# Patient Record
Sex: FEMALE | Race: WHITE | NOT HISPANIC OR LATINO | Employment: UNEMPLOYED | URBAN - METROPOLITAN AREA
[De-identification: names, ages, dates, MRNs, and addresses within clinical notes are randomized per-mention and may not be internally consistent; named-entity substitution may affect disease eponyms.]

---

## 2018-07-02 ENCOUNTER — OFFICE VISIT CONVERTED (OUTPATIENT)
Dept: FAMILY MEDICINE CLINIC | Facility: CLINIC | Age: 23
End: 2018-07-02
Attending: NURSE PRACTITIONER

## 2018-08-15 ENCOUNTER — OFFICE VISIT CONVERTED (OUTPATIENT)
Dept: FAMILY MEDICINE CLINIC | Facility: CLINIC | Age: 23
End: 2018-08-15
Attending: NURSE PRACTITIONER

## 2019-09-12 ENCOUNTER — OFFICE VISIT CONVERTED (OUTPATIENT)
Dept: FAMILY MEDICINE CLINIC | Facility: CLINIC | Age: 24
End: 2019-09-12
Attending: NURSE PRACTITIONER

## 2019-09-13 ENCOUNTER — HOSPITAL ENCOUNTER (OUTPATIENT)
Dept: FAMILY MEDICINE CLINIC | Facility: CLINIC | Age: 24
Discharge: HOME | End: 2019-09-13
Attending: NURSE PRACTITIONER

## 2019-09-13 ENCOUNTER — OFFICE VISIT CONVERTED (OUTPATIENT)
Dept: FAMILY MEDICINE CLINIC | Facility: CLINIC | Age: 24
End: 2019-09-13
Attending: NURSE PRACTITIONER

## 2019-09-13 LAB
25(OH)D3 SERPL-MCNC: 26.2 NG/ML (ref 30–100)
ALBUMIN SERPL-MCNC: 4 G/DL (ref 3.5–5)
ALBUMIN/GLOB SERPL: 1.1 {RATIO} (ref 1.4–2.6)
ALP SERPL-CCNC: 67 U/L (ref 42–98)
ALT SERPL-CCNC: 23 U/L (ref 10–40)
ANION GAP SERPL CALC-SCNC: 18 MMOL/L (ref 8–19)
AST SERPL-CCNC: 20 U/L (ref 15–50)
BASOPHILS # BLD AUTO: 0.06 10*3/UL (ref 0–0.2)
BASOPHILS NFR BLD AUTO: 0.7 % (ref 0–3)
BILIRUB SERPL-MCNC: 0.17 MG/DL (ref 0.2–1.3)
BUN SERPL-MCNC: 13 MG/DL (ref 5–25)
BUN/CREAT SERPL: 25 {RATIO} (ref 6–20)
CALCIUM SERPL-MCNC: 9.1 MG/DL (ref 8.7–10.4)
CHLORIDE SERPL-SCNC: 104 MMOL/L (ref 99–111)
CHOLEST SERPL-MCNC: 141 MG/DL (ref 107–200)
CHOLEST/HDLC SERPL: 4.1 {RATIO} (ref 3–6)
CONV ABS IMM GRAN: 0.02 10*3/UL (ref 0–0.2)
CONV CO2: 20 MMOL/L (ref 22–32)
CONV IMMATURE GRAN: 0.2 % (ref 0–1.8)
CONV TOTAL PROTEIN: 7.7 G/DL (ref 6.3–8.2)
CREAT UR-MCNC: 0.52 MG/DL (ref 0.5–0.9)
DEPRECATED RDW RBC AUTO: 41.6 FL (ref 36.4–46.3)
EOSINOPHIL # BLD AUTO: 0.19 10*3/UL (ref 0–0.7)
EOSINOPHIL # BLD AUTO: 2.1 % (ref 0–7)
ERYTHROCYTE [DISTWIDTH] IN BLOOD BY AUTOMATED COUNT: 13.3 % (ref 11.7–14.4)
GFR SERPLBLD BASED ON 1.73 SQ M-ARVRAT: >60 ML/MIN/{1.73_M2}
GLOBULIN UR ELPH-MCNC: 3.7 G/DL (ref 2–3.5)
GLUCOSE SERPL-MCNC: 91 MG/DL (ref 65–99)
HCT VFR BLD AUTO: 43.3 % (ref 37–47)
HDLC SERPL-MCNC: 34 MG/DL (ref 40–60)
HGB BLD-MCNC: 14.1 G/DL (ref 12–16)
LDLC SERPL CALC-MCNC: 90 MG/DL (ref 70–100)
LYMPHOCYTES # BLD AUTO: 1.88 10*3/UL (ref 1–5)
LYMPHOCYTES NFR BLD AUTO: 21.1 % (ref 20–45)
MCH RBC QN AUTO: 28.1 PG (ref 27–31)
MCHC RBC AUTO-ENTMCNC: 32.6 G/DL (ref 33–37)
MCV RBC AUTO: 86.3 FL (ref 81–99)
MONOCYTES # BLD AUTO: 0.54 10*3/UL (ref 0.2–1.2)
MONOCYTES NFR BLD AUTO: 6.1 % (ref 3–10)
NEUTROPHILS # BLD AUTO: 6.23 10*3/UL (ref 2–8)
NEUTROPHILS NFR BLD AUTO: 69.8 % (ref 30–85)
NRBC CBCN: 0 % (ref 0–0.7)
OSMOLALITY SERPL CALC.SUM OF ELEC: 286 MOSM/KG (ref 273–304)
PLATELET # BLD AUTO: 322 10*3/UL (ref 130–400)
PMV BLD AUTO: 11.5 FL (ref 9.4–12.3)
POTASSIUM SERPL-SCNC: 4.3 MMOL/L (ref 3.5–5.3)
RBC # BLD AUTO: 5.02 10*6/UL (ref 4.2–5.4)
SODIUM SERPL-SCNC: 138 MMOL/L (ref 135–147)
T4 FREE SERPL-MCNC: 1.3 NG/DL (ref 0.9–1.8)
TRIGL SERPL-MCNC: 86 MG/DL (ref 40–150)
TSH SERPL-ACNC: 2.33 M[IU]/L (ref 0.27–4.2)
VIT B12 SERPL-MCNC: 823 PG/ML (ref 211–911)
VLDLC SERPL-MCNC: 17 MG/DL (ref 5–37)
WBC # BLD AUTO: 8.92 10*3/UL (ref 4.8–10.8)

## 2019-10-21 ENCOUNTER — OFFICE VISIT CONVERTED (OUTPATIENT)
Dept: FAMILY MEDICINE CLINIC | Facility: CLINIC | Age: 24
End: 2019-10-21
Attending: NURSE PRACTITIONER

## 2020-08-24 ENCOUNTER — HOSPITAL ENCOUNTER (OUTPATIENT)
Dept: URGENT CARE | Facility: CLINIC | Age: 25
Discharge: HOME | End: 2020-08-24
Attending: FAMILY MEDICINE

## 2020-12-03 ENCOUNTER — HOSPITAL ENCOUNTER (OUTPATIENT)
Dept: URGENT CARE | Facility: CLINIC | Age: 25
Discharge: HOME | End: 2020-12-03

## 2021-05-15 VITALS
SYSTOLIC BLOOD PRESSURE: 109 MMHG | OXYGEN SATURATION: 98 % | WEIGHT: 293 LBS | DIASTOLIC BLOOD PRESSURE: 90 MMHG | RESPIRATION RATE: 16 BRPM | HEART RATE: 94 BPM | TEMPERATURE: 97.9 F | HEIGHT: 64 IN | BODY MASS INDEX: 50.02 KG/M2

## 2021-05-15 VITALS
SYSTOLIC BLOOD PRESSURE: 131 MMHG | DIASTOLIC BLOOD PRESSURE: 76 MMHG | BODY MASS INDEX: 50.02 KG/M2 | RESPIRATION RATE: 16 BRPM | HEIGHT: 64 IN | OXYGEN SATURATION: 100 % | HEART RATE: 98 BPM | TEMPERATURE: 98.1 F | WEIGHT: 293 LBS

## 2021-05-15 VITALS
RESPIRATION RATE: 18 BRPM | HEART RATE: 113 BPM | BODY MASS INDEX: 50.02 KG/M2 | TEMPERATURE: 97.7 F | HEIGHT: 64 IN | DIASTOLIC BLOOD PRESSURE: 65 MMHG | OXYGEN SATURATION: 98 % | SYSTOLIC BLOOD PRESSURE: 118 MMHG | WEIGHT: 293 LBS

## 2021-05-16 VITALS
RESPIRATION RATE: 14 BRPM | HEIGHT: 64 IN | WEIGHT: 293 LBS | TEMPERATURE: 98.6 F | BODY MASS INDEX: 50.02 KG/M2 | HEART RATE: 99 BPM | DIASTOLIC BLOOD PRESSURE: 74 MMHG | SYSTOLIC BLOOD PRESSURE: 127 MMHG | OXYGEN SATURATION: 99 %

## 2021-05-16 VITALS
BODY MASS INDEX: 50.02 KG/M2 | WEIGHT: 293 LBS | TEMPERATURE: 98.1 F | SYSTOLIC BLOOD PRESSURE: 115 MMHG | HEART RATE: 100 BPM | OXYGEN SATURATION: 100 % | HEIGHT: 64 IN | DIASTOLIC BLOOD PRESSURE: 77 MMHG | RESPIRATION RATE: 18 BRPM

## 2021-07-01 ENCOUNTER — HOSPITAL ENCOUNTER (EMERGENCY)
Facility: HOSPITAL | Age: 26
Discharge: HOME | End: 2021-07-01
Attending: EMERGENCY MEDICINE | Admitting: EMERGENCY MEDICINE

## 2021-07-01 VITALS
HEIGHT: 65 IN | WEIGHT: 284.39 LBS | HEART RATE: 84 BPM | SYSTOLIC BLOOD PRESSURE: 125 MMHG | DIASTOLIC BLOOD PRESSURE: 72 MMHG | TEMPERATURE: 98.9 F | OXYGEN SATURATION: 99 % | BODY MASS INDEX: 47.38 KG/M2 | RESPIRATION RATE: 18 BRPM

## 2021-07-01 DIAGNOSIS — S39.012A BACK STRAIN, INITIAL ENCOUNTER: Primary | ICD-10-CM

## 2021-07-01 PROCEDURE — 25010000002 KETOROLAC TROMETHAMINE PER 15 MG: Performed by: NURSE PRACTITIONER

## 2021-07-01 PROCEDURE — 96372 THER/PROPH/DIAG INJ SC/IM: CPT

## 2021-07-01 PROCEDURE — 99283 EMERGENCY DEPT VISIT LOW MDM: CPT

## 2021-07-01 RX ORDER — CYANOCOBALAMIN (VITAMIN B-12) 500 MCG
TABLET ORAL
COMMUNITY
End: 2021-09-30

## 2021-07-01 RX ORDER — KETOROLAC TROMETHAMINE 10 MG/1
10 TABLET, FILM COATED ORAL EVERY 6 HOURS PRN
Qty: 12 TABLET | Refills: 0 | Status: SHIPPED | OUTPATIENT
Start: 2021-07-01 | End: 2021-09-30

## 2021-07-01 RX ORDER — CYCLOBENZAPRINE HCL 10 MG
10 TABLET ORAL 3 TIMES DAILY PRN
Qty: 10 TABLET | Refills: 0 | Status: SHIPPED | OUTPATIENT
Start: 2021-07-01 | End: 2021-09-30

## 2021-07-01 RX ORDER — MEDROXYPROGESTERONE ACETATE 150 MG/ML
150 INJECTION, SUSPENSION INTRAMUSCULAR
COMMUNITY

## 2021-07-01 RX ORDER — KETOROLAC TROMETHAMINE 30 MG/ML
60 INJECTION, SOLUTION INTRAMUSCULAR; INTRAVENOUS ONCE
Status: COMPLETED | OUTPATIENT
Start: 2021-07-01 | End: 2021-07-01

## 2021-07-01 RX ORDER — CLINDAMYCIN HYDROCHLORIDE 300 MG/1
300 CAPSULE ORAL 4 TIMES DAILY
Qty: 40 CAPSULE | Refills: 0 | Status: SHIPPED | OUTPATIENT
Start: 2021-07-01 | End: 2021-07-01

## 2021-07-01 RX ORDER — FLUCONAZOLE 150 MG/1
150 TABLET ORAL ONCE
Qty: 1 TABLET | Refills: 0 | Status: SHIPPED | OUTPATIENT
Start: 2021-07-01 | End: 2021-07-01

## 2021-07-01 RX ADMIN — KETOROLAC TROMETHAMINE 60 MG: 60 INJECTION, SOLUTION INTRAMUSCULAR at 23:04

## 2021-07-02 NOTE — ED PROVIDER NOTES
"Subjective   Patient reports that last night she was trying to  a tube TV to move it into another room of her house.  She states that she picked it up and twisted which caused her to \"throw her back out\".  She reports that she dropped the TV and has had left low back pain ever since.  She does report that she has taken Tylenol which did not really seem to help much.      History provided by:  Patient   used: No        Review of Systems   Musculoskeletal: Positive for back pain (Left lower).   All other systems reviewed and are negative.      Past Medical History:   Diagnosis Date   • Bronchitis     chronic       Allergies   Allergen Reactions   • Amoxicillin Hives       Past Surgical History:   Procedure Laterality Date   •  SECTION         History reviewed. No pertinent family history.    Social History     Socioeconomic History   • Marital status: Single     Spouse name: Not on file   • Number of children: Not on file   • Years of education: Not on file   • Highest education level: Not on file   Tobacco Use   • Smoking status: Current Every Day Smoker     Packs/day: 0.50     Types: Cigarettes   Substance and Sexual Activity   • Alcohol use: Not Currently     Comment: socially   • Drug use: Never           Objective   Physical Exam  Vitals and nursing note reviewed.   Constitutional:       General: She is not in acute distress.     Appearance: Normal appearance. She is obese. She is not ill-appearing, toxic-appearing or diaphoretic.   HENT:      Head: Normocephalic and atraumatic.   Eyes:      Pupils: Pupils are equal, round, and reactive to light.   Cardiovascular:      Rate and Rhythm: Normal rate.   Pulmonary:      Effort: Pulmonary effort is normal.   Musculoskeletal:         General: Tenderness (Left lower back) and signs of injury (Left lower back) present.      Cervical back: Normal range of motion and neck supple.   Skin:     General: Skin is warm and dry.   Neurological: "      General: No focal deficit present.      Mental Status: She is alert and oriented to person, place, and time.   Psychiatric:         Mood and Affect: Mood normal.         Behavior: Behavior normal.         Thought Content: Thought content normal.         Procedures           ED Course                                           MDM  Number of Diagnoses or Management Options  Back strain, initial encounter: new and requires workup  Patient Progress  Patient progress: stable      Final diagnoses:   Back strain, initial encounter       ED Disposition  ED Disposition     ED Disposition Condition Comment    Discharge Stable           Provider, No Known  Psychiatric [id number]      list provided         Medication List      New Prescriptions    cyclobenzaprine 10 MG tablet  Commonly known as: FLEXERIL  Take 1 tablet by mouth 3 (Three) Times a Day As Needed for Muscle Spasms.     ketorolac 10 MG tablet  Commonly known as: TORADOL  Take 1 tablet by mouth Every 6 (Six) Hours As Needed for Moderate Pain .           Where to Get Your Medications      These medications were sent to [id number] - [place], [place] - [address] AT [facility] - [phone] [facility] [phone]   [address] [place] [place] [address]    Hours: 24-hours Phone: [phone]   · cyclobenzaprine 10 MG tablet  · ketorolac 10 MG tablet          Michaela Lindsay APRN  07/02/21 0152

## 2021-07-02 NOTE — ED TRIAGE NOTES
Pt c/o low back pain, radiates to the left side, no loss of bowel or bladder control. states she was lifting a heavy TV and twisted her back the wrong way, this occurred last night, now hurts to turn her back.

## 2021-09-30 ENCOUNTER — APPOINTMENT (OUTPATIENT)
Dept: CT IMAGING | Facility: HOSPITAL | Age: 26
End: 2021-09-30

## 2021-09-30 ENCOUNTER — HOSPITAL ENCOUNTER (EMERGENCY)
Facility: HOSPITAL | Age: 26
Discharge: HOME | End: 2021-09-30
Attending: EMERGENCY MEDICINE | Admitting: EMERGENCY MEDICINE

## 2021-09-30 VITALS
SYSTOLIC BLOOD PRESSURE: 130 MMHG | TEMPERATURE: 98.4 F | RESPIRATION RATE: 20 BRPM | DIASTOLIC BLOOD PRESSURE: 87 MMHG | BODY MASS INDEX: 47.97 KG/M2 | HEART RATE: 79 BPM | WEIGHT: 287.92 LBS | HEIGHT: 65 IN | OXYGEN SATURATION: 99 %

## 2021-09-30 DIAGNOSIS — R10.32 LEFT LOWER QUADRANT ABDOMINAL PAIN: Primary | ICD-10-CM

## 2021-09-30 DIAGNOSIS — K62.5 RECTAL BLEEDING: ICD-10-CM

## 2021-09-30 LAB
ALBUMIN SERPL-MCNC: 4.9 G/DL (ref 3.5–5.2)
ALBUMIN/GLOB SERPL: 1.4 G/DL
ALP SERPL-CCNC: 66 U/L (ref 39–117)
ALT SERPL W P-5'-P-CCNC: 30 U/L (ref 1–33)
AMORPH URATE CRY URNS QL MICRO: ABNORMAL /HPF
ANION GAP SERPL CALCULATED.3IONS-SCNC: 12.4 MMOL/L (ref 5–15)
AST SERPL-CCNC: 22 U/L (ref 1–32)
BACTERIA UR QL AUTO: ABNORMAL /HPF
BASOPHILS # BLD AUTO: 0.06 10*3/MM3 (ref 0–0.2)
BASOPHILS NFR BLD AUTO: 0.5 % (ref 0–1.5)
BILIRUB SERPL-MCNC: 0.2 MG/DL (ref 0–1.2)
BILIRUB UR QL STRIP: NEGATIVE
BUN SERPL-MCNC: 12 MG/DL (ref 6–20)
BUN/CREAT SERPL: 19.7 (ref 7–25)
CALCIUM SPEC-SCNC: 9.7 MG/DL (ref 8.6–10.5)
CHLORIDE SERPL-SCNC: 104 MMOL/L (ref 98–107)
CLARITY UR: ABNORMAL
CO2 SERPL-SCNC: 22.6 MMOL/L (ref 22–29)
COLOR UR: YELLOW
CREAT SERPL-MCNC: 0.61 MG/DL (ref 0.57–1)
DEPRECATED RDW RBC AUTO: 41.5 FL (ref 37–54)
EOSINOPHIL # BLD AUTO: 0.17 10*3/MM3 (ref 0–0.4)
EOSINOPHIL NFR BLD AUTO: 1.4 % (ref 0.3–6.2)
ERYTHROCYTE [DISTWIDTH] IN BLOOD BY AUTOMATED COUNT: 12.6 % (ref 12.3–15.4)
GFR SERPL CREATININE-BSD FRML MDRD: 120 ML/MIN/1.73
GLOBULIN UR ELPH-MCNC: 3.4 GM/DL
GLUCOSE SERPL-MCNC: 87 MG/DL (ref 65–99)
GLUCOSE UR STRIP-MCNC: NEGATIVE MG/DL
HCG INTACT+B SERPL-ACNC: <0.5 MIU/ML
HCT VFR BLD AUTO: 44.9 % (ref 34–46.6)
HGB BLD-MCNC: 15.2 G/DL (ref 12–15.9)
HGB UR QL STRIP.AUTO: NEGATIVE
HOLD SPECIMEN: NORMAL
HOLD SPECIMEN: NORMAL
HYALINE CASTS UR QL AUTO: ABNORMAL /LPF
IMM GRANULOCYTES # BLD AUTO: 0.04 10*3/MM3 (ref 0–0.05)
IMM GRANULOCYTES NFR BLD AUTO: 0.3 % (ref 0–0.5)
KETONES UR QL STRIP: NEGATIVE
LEUKOCYTE ESTERASE UR QL STRIP.AUTO: ABNORMAL
LIPASE SERPL-CCNC: 20 U/L (ref 13–60)
LYMPHOCYTES # BLD AUTO: 3.04 10*3/MM3 (ref 0.7–3.1)
LYMPHOCYTES NFR BLD AUTO: 24.1 % (ref 19.6–45.3)
MCH RBC QN AUTO: 30.2 PG (ref 26.6–33)
MCHC RBC AUTO-ENTMCNC: 33.9 G/DL (ref 31.5–35.7)
MCV RBC AUTO: 89.3 FL (ref 79–97)
MONOCYTES # BLD AUTO: 0.6 10*3/MM3 (ref 0.1–0.9)
MONOCYTES NFR BLD AUTO: 4.8 % (ref 5–12)
NEUTROPHILS NFR BLD AUTO: 68.9 % (ref 42.7–76)
NEUTROPHILS NFR BLD AUTO: 8.68 10*3/MM3 (ref 1.7–7)
NITRITE UR QL STRIP: NEGATIVE
NRBC BLD AUTO-RTO: 0 /100 WBC (ref 0–0.2)
PH UR STRIP.AUTO: 7 [PH] (ref 5–8)
PLATELET # BLD AUTO: 321 10*3/MM3 (ref 140–450)
PMV BLD AUTO: 10 FL (ref 6–12)
POTASSIUM SERPL-SCNC: 3.9 MMOL/L (ref 3.5–5.2)
PROT SERPL-MCNC: 8.3 G/DL (ref 6–8.5)
PROT UR QL STRIP: NEGATIVE
RBC # BLD AUTO: 5.03 10*6/MM3 (ref 3.77–5.28)
RBC # UR: ABNORMAL /HPF
REF LAB TEST METHOD: ABNORMAL
SODIUM SERPL-SCNC: 139 MMOL/L (ref 136–145)
SP GR UR STRIP: 1.01 (ref 1–1.03)
SQUAMOUS #/AREA URNS HPF: ABNORMAL /HPF
UROBILINOGEN UR QL STRIP: ABNORMAL
WBC # BLD AUTO: 12.59 10*3/MM3 (ref 3.4–10.8)
WBC UR QL AUTO: ABNORMAL /HPF
WHOLE BLOOD HOLD SPECIMEN: NORMAL
WHOLE BLOOD HOLD SPECIMEN: NORMAL

## 2021-09-30 PROCEDURE — 74177 CT ABD & PELVIS W/CONTRAST: CPT

## 2021-09-30 PROCEDURE — 84702 CHORIONIC GONADOTROPIN TEST: CPT | Performed by: EMERGENCY MEDICINE

## 2021-09-30 PROCEDURE — 99283 EMERGENCY DEPT VISIT LOW MDM: CPT

## 2021-09-30 PROCEDURE — 0 IOPAMIDOL PER 1 ML: Performed by: EMERGENCY MEDICINE

## 2021-09-30 PROCEDURE — 83690 ASSAY OF LIPASE: CPT | Performed by: EMERGENCY MEDICINE

## 2021-09-30 PROCEDURE — 80053 COMPREHEN METABOLIC PANEL: CPT | Performed by: EMERGENCY MEDICINE

## 2021-09-30 PROCEDURE — 85025 COMPLETE CBC W/AUTO DIFF WBC: CPT | Performed by: EMERGENCY MEDICINE

## 2021-09-30 PROCEDURE — 81001 URINALYSIS AUTO W/SCOPE: CPT | Performed by: EMERGENCY MEDICINE

## 2021-09-30 RX ORDER — SODIUM CHLORIDE 0.9 % (FLUSH) 0.9 %
10 SYRINGE (ML) INJECTION AS NEEDED
Status: DISCONTINUED | OUTPATIENT
Start: 2021-09-30 | End: 2021-10-01 | Stop reason: HOSPADM

## 2021-09-30 RX ORDER — DICYCLOMINE HCL 20 MG
20 TABLET ORAL EVERY 6 HOURS
Qty: 28 TABLET | Refills: 0 | Status: SHIPPED | OUTPATIENT
Start: 2021-09-30 | End: 2021-10-07

## 2021-09-30 RX ADMIN — SODIUM CHLORIDE 1000 ML: 9 INJECTION, SOLUTION INTRAVENOUS at 18:45

## 2021-09-30 RX ADMIN — IOPAMIDOL 100 ML: 755 INJECTION, SOLUTION INTRAVENOUS at 20:05

## 2021-10-01 NOTE — DISCHARGE INSTRUCTIONS
Please follow-up with your doctor tomorrow for serial reexamination of the abdomen.    Please discuss the need for gastroenterology referral for colonoscopy    Please return to the emergency room for increased bleeding, increased pain, fever, intractable vomiting, near passing out, passing out, extreme fatigue, weakness or shortness of breath

## 2022-12-07 ENCOUNTER — OFFICE VISIT (OUTPATIENT)
Dept: NEUROLOGY | Facility: CLINIC | Age: 27
End: 2022-12-07

## 2022-12-07 VITALS
HEART RATE: 115 BPM | DIASTOLIC BLOOD PRESSURE: 75 MMHG | HEIGHT: 65 IN | WEIGHT: 293 LBS | BODY MASS INDEX: 48.82 KG/M2 | SYSTOLIC BLOOD PRESSURE: 120 MMHG

## 2022-12-07 DIAGNOSIS — Z87.898 HISTORY OF SEIZURES: ICD-10-CM

## 2022-12-07 DIAGNOSIS — R40.4 STARING EPISODES: ICD-10-CM

## 2022-12-07 DIAGNOSIS — G43.009 MIGRAINE WITHOUT AURA AND WITHOUT STATUS MIGRAINOSUS, NOT INTRACTABLE: Primary | ICD-10-CM

## 2022-12-07 DIAGNOSIS — R20.2 PARESTHESIA: ICD-10-CM

## 2022-12-07 PROBLEM — F31.70 BIPOLAR AFFECTIVE DISORDER IN REMISSION: Status: ACTIVE | Noted: 2022-12-07

## 2022-12-07 PROBLEM — F41.9 ANXIETY AND DEPRESSION: Status: ACTIVE | Noted: 2022-08-29

## 2022-12-07 PROBLEM — F32.A ANXIETY AND DEPRESSION: Status: ACTIVE | Noted: 2022-08-29

## 2022-12-07 PROBLEM — G43.909 MIGRAINE WITHOUT STATUS MIGRAINOSUS, NOT INTRACTABLE: Status: ACTIVE | Noted: 2022-08-29

## 2022-12-07 PROCEDURE — 99204 OFFICE O/P NEW MOD 45 MIN: CPT | Performed by: NURSE PRACTITIONER

## 2022-12-07 RX ORDER — VENLAFAXINE HYDROCHLORIDE 75 MG/1
75 CAPSULE, EXTENDED RELEASE ORAL DAILY
COMMUNITY
Start: 2022-11-23 | End: 2023-01-09

## 2022-12-07 RX ORDER — CYCLOBENZAPRINE HCL 10 MG
10 TABLET ORAL 3 TIMES DAILY PRN
COMMUNITY
Start: 2022-11-30

## 2022-12-07 RX ORDER — ROPINIROLE 4 MG/1
4 TABLET, FILM COATED ORAL
COMMUNITY
Start: 2022-11-30

## 2022-12-07 RX ORDER — CETIRIZINE HYDROCHLORIDE 10 MG/1
10 TABLET ORAL DAILY
COMMUNITY
Start: 2022-11-30

## 2022-12-07 RX ORDER — MELOXICAM 15 MG/1
15 TABLET ORAL DAILY
COMMUNITY
Start: 2022-08-29 | End: 2023-08-30

## 2022-12-07 RX ORDER — ARIPIPRAZOLE 2 MG/1
2 TABLET ORAL TAKE AS DIRECTED
COMMUNITY
Start: 2022-08-19 | End: 2023-01-09 | Stop reason: DRUGHIGH

## 2022-12-07 RX ORDER — ALBUTEROL SULFATE 90 UG/1
2 AEROSOL, METERED RESPIRATORY (INHALATION) AS NEEDED
COMMUNITY
Start: 2022-08-29 | End: 2023-08-30

## 2022-12-07 RX ORDER — CLINDAMYCIN HYDROCHLORIDE 300 MG/1
CAPSULE ORAL TAKE AS DIRECTED
COMMUNITY
Start: 2022-11-28 | End: 2023-01-09

## 2022-12-07 RX ORDER — VENLAFAXINE HYDROCHLORIDE 150 MG/1
150 CAPSULE, EXTENDED RELEASE ORAL DAILY
COMMUNITY
Start: 2022-11-23

## 2022-12-07 RX ORDER — TRAZODONE HYDROCHLORIDE 50 MG/1
50 TABLET ORAL
COMMUNITY
Start: 2022-10-26 | End: 2023-03-16 | Stop reason: DRUGHIGH

## 2022-12-07 RX ORDER — LAMOTRIGINE 25 MG/1
TABLET ORAL TAKE AS DIRECTED
COMMUNITY
Start: 2022-11-20 | End: 2023-03-16

## 2022-12-07 RX ORDER — SUMATRIPTAN 50 MG/1
50 TABLET, FILM COATED ORAL ONCE AS NEEDED
Qty: 9 TABLET | Refills: 3 | Status: SHIPPED | OUTPATIENT
Start: 2022-12-07 | End: 2023-03-21

## 2022-12-07 RX ORDER — PROPRANOLOL HYDROCHLORIDE 20 MG/1
20 TABLET ORAL 2 TIMES DAILY
Qty: 60 TABLET | Refills: 3 | Status: SHIPPED | OUTPATIENT
Start: 2022-12-07

## 2022-12-07 NOTE — PATIENT INSTRUCTIONS
Migraine Headache  A migraine headache is an intense, throbbing pain on one side or both sides of the head. Migraine headaches may also cause other symptoms, such as nausea, vomiting, and sensitivity to light and noise. A migraine headache can last from 4 hours to 3 days. Talk with your doctor about what things may bring on (trigger) your migraine headaches.  What are the causes?  The exact cause of this condition is not known. However, a migraine may be caused when nerves in the brain become irritated and release chemicals that cause inflammation of blood vessels. This inflammation causes pain. This condition may be triggered or caused by:  Drinking alcohol.  Smoking.  Taking medicines, such as:  Medicine used to treat chest pain (nitroglycerin).  Birth control pills.  Estrogen.  Certain blood pressure medicines.  Eating or drinking products that contain nitrates, glutamate, aspartame, or tyramine. Aged cheeses, chocolate, or caffeine may also be triggers.  Doing physical activity.  Other things that may trigger a migraine headache include:  Menstruation.  Pregnancy.  Hunger.  Stress.  Lack of sleep or too much sleep.  Weather changes.  Fatigue.  What increases the risk?  The following factors may make you more likely to experience migraine headaches:  Being a certain age. This condition is more common in people who are 25-55 years old.  Being female.  Having a family history of migraine headaches.  Being .  Having a mental health condition, such as depression or anxiety.  Being obese.  What are the signs or symptoms?  The main symptom of this condition is pulsating or throbbing pain. This pain may:  Happen in any area of the head, such as on one side or both sides.  Interfere with daily activities.  Get worse with physical activity.  Get worse with exposure to bright lights or loud noises.  Other symptoms may include:  Nausea.  Vomiting.  Dizziness.  General sensitivity to bright lights, loud noises, or  smells.  Before you get a migraine headache, you may get warning signs (an aura). An aura may include:  Seeing flashing lights or having blind spots.  Seeing bright spots, halos, or zigzag lines.  Having tunnel vision or blurred vision.  Having numbness or a tingling feeling.  Having trouble talking.  Having muscle weakness.  Some people have symptoms after a migraine headache (postdromal phase), such as:  Feeling tired.  Difficulty concentrating.  How is this diagnosed?  A migraine headache can be diagnosed based on:  Your symptoms.  A physical exam.  Tests, such as:  CT scan or an MRI of the head. These imaging tests can help rule out other causes of headaches.  Taking fluid from the spine (lumbar puncture) and analyzing it (cerebrospinal fluid analysis, or CSF analysis).  How is this treated?  This condition may be treated with medicines that:  Relieve pain.  Relieve nausea.  Prevent migraine headaches.  Treatment for this condition may also include:  Acupuncture.  Lifestyle changes like avoiding foods that trigger migraine headaches.  Biofeedback.  Cognitive behavioral therapy.  Follow these instructions at home:  Medicines  Take over-the-counter and prescription medicines only as told by your health care provider.  Ask your health care provider if the medicine prescribed to you:  Requires you to avoid driving or using heavy machinery.  Can cause constipation. You may need to take these actions to prevent or treat constipation:  Drink enough fluid to keep your urine pale yellow.  Take over-the-counter or prescription medicines.  Eat foods that are high in fiber, such as beans, whole grains, and fresh fruits and vegetables.  Limit foods that are high in fat and processed sugars, such as fried or sweet foods.  Lifestyle  Do not drink alcohol.  Do not use any products that contain nicotine or tobacco, such as cigarettes, e-cigarettes, and chewing tobacco. If you need help quitting, ask your health care  provider.  Get at least 8 hours of sleep every night.  Find ways to manage stress, such as meditation, deep breathing, or yoga.  General instructions     Keep a journal to find out what may trigger your migraine headaches. For example, write down:  What you eat and drink.  How much sleep you get.  Any change to your diet or medicines.  If you have a migraine headache:  Avoid things that make your symptoms worse, such as bright lights.  It may help to lie down in a dark, quiet room.  Do not drive or use heavy machinery.  Ask your health care provider what activities are safe for you while you are experiencing symptoms.  Keep all follow-up visits as told by your health care provider. This is important.  Contact a health care provider if:  You develop symptoms that are different or more severe than your usual migraine headache symptoms.  You have more than 15 headache days in one month.  Get help right away if:  Your migraine headache becomes severe.  Your migraine headache lasts longer than 72 hours.  You have a fever.  You have a stiff neck.  You have vision loss.  Your muscles feel weak or like you cannot control them.  You start to lose your balance often.  You have trouble walking.  You faint.  You have a seizure.  Summary  A migraine headache is an intense, throbbing pain on one side or both sides of the head. Migraines may also cause other symptoms, such as nausea, vomiting, and sensitivity to light and noise.  This condition may be treated with medicines and lifestyle changes. You may also need to avoid certain things that trigger a migraine headache.  Keep a journal to find out what may trigger your migraine headaches.  Contact your health care provider if you have more than 15 headache days in a month or you develop symptoms that are different or more severe than your usual migraine headache symptoms.  This information is not intended to replace advice given to you by your health care provider. Make sure you  discuss any questions you have with your health care provider.  Document Revised: 04/10/2020 Document Reviewed: 01/30/2020  Elsevier Patient Education © 2022 [employer]  Seizure, Adult  A seizure is a sudden burst of abnormal electrical and chemical activity in the brain. Seizures usually last from 30 seconds to 2 minutes. The abnormal activity temporarily interrupts normal brain function.  Many types of seizures can affect adults. A seizure can cause many different symptoms depending on where in the brain it starts.  What are the causes?  Common causes of this condition include:  Fever or infection.  Brain injury, head trauma, bleeding in the brain, or a brain tumor.  Low levels of blood sugar or salt (sodium).  Kidney problems or liver problems.  Metabolic disorders or other conditions that are passed from parent to child (are inherited).  Reaction to a substance, such as a drug or a medicine, or suddenly stopping the use of a substance (withdrawal).  A stroke.  Developmental disorders such as autism spectrum disorder or cerebral palsy.  In some cases, the cause of a seizure may not be known. Some people who have a seizure never have another one. A person who has repeated seizures over time without a clear cause has a condition called epilepsy.  What increases the risk?  You are more likely to develop this condition if:  You have a family history of epilepsy.  You have had a tonic-clonic seizure before. This type of seizure causes tightening (contraction) of the muscles of the whole body and loss of consciousness.  You have a history of head trauma, lack of oxygen at birth, or strokes.  What are the signs or symptoms?  There are many different types of seizures. The symptoms vary depending on the type of seizure you have. Symptoms occur during the seizure. They may also occur before a seizure (aura) and after a seizure (postictal). Symptoms may include the following:  Symptoms during a seizure  Uncontrollable  shaking (convulsions) with fast, jerky movements of muscles.  Stiffening of the body.  Breathing problems.  Confusion, staring, or unresponsiveness.  Head nodding, eye blinking or fluttering, or rapid eye movements.  Drooling, grunting, or making clicking sounds with your mouth.  Loss of bladder control and bowel control.  Symptoms before a seizure  Fear or anxiety.  Nausea.  Vertigo. This is a feeling like:  You are moving when you are not.  Your surroundings are moving when they are not.  Déjà vu. This is a feeling of having seen or heard something before.  Odd tastes or smells.  Changes in vision, such as seeing flashing lights or spots.  Symptoms after a seizure  Confusion.  Sleepiness.  Headache.  Sore muscles.  How is this diagnosed?  This condition may be diagnosed based on:  A description of your symptoms. Video of your seizures can be helpful.  Your medical history.  A physical exam.  You may also have tests, including:  Blood tests.  CT scan.  MRI.  Electroencephalogram (EEG). This test measures electrical activity in the brain. An EEG can predict whether seizures will return.  A spinal tap, also called a lumbar puncture. This is the removal and testing of fluid that surrounds the brain and spinal cord.  How is this treated?  Most seizures will stop on their own in less than 5 minutes, and no treatment is needed. Seizures that last longer than 5 minutes will usually need treatment.  Seizures may be treated with:  Medicines given through an IV.  Avoiding known triggers, such as medicines that you take for another condition.  Medicines to control seizures or prevent future seizures (antiepileptics), if epilepsy caused your seizures.  Medical devices to prevent and control seizures.  Surgery to stop seizures or to reduce how often seizures happen, if you have epilepsy that does not respond to medicines.  A diet low in carbohydrates and high in fat (ketogenic diet).  Follow these instructions at  home:  Medicines  Take over-the-counter and prescription medicines only as told by your health care provider.  Avoid any substances that may prevent your medicine from working properly, such as alcohol.  Activity  Follow instructions about activities, such as driving or swimming, that would be dangerous if you had another seizure. Wait until your health care provider says it is safe to do them.  If you live in the [place] check with your local department of motor vehicles (DMV) to find out about local driving laws. Each state has specific rules about when you can legally drive again.  Get enough rest. Lack of sleep can make seizures more likely to occur.  Educating others    Teach friends and family what to do if you have a seizure. They should:  Help you get down to the ground, to prevent a fall.  Cushion your head and move items away from your body.  Loosen any tight clothing around your neck.  Turn you on your side. If you vomit, this helps keep your airway clear.  Know whether or not you need emergency care.  Stay with you until you recover.  Also, tell them what not to do if you have a seizure. Tell them:  They should not hold you down. Holding you down will not stop the seizure.  They should not put anything in your mouth.  General instructions  Avoid anything that has ever triggered a seizure for you.  Keep a seizure diary. Record what you remember about each seizure, especially anything that might have triggered it.  Keep all follow-up visits. This is important.  Contact a health care provider if:  You have another seizure or seizures. Call each time you have a seizure.  Your seizure pattern changes.  You continue to have seizures with treatment.  You have symptoms of an infection or illness. Either of these might increase your risk of having a seizure.  You are unable to take your medicine.  Get help right away if:  You have:  A seizure that does not stop after 5 minutes.  Several seizures in a row without a  complete recovery between seizures.  A seizure that makes it harder to breathe.  A seizure that leaves you unable to speak or use a part of your body.  You do not wake up right away after a seizure.  You injure yourself during a seizure.  You have confusion or pain right after a seizure.  These symptoms may represent a serious problem that is an emergency. Do not wait to see if the symptoms will go away. Get medical help right away. Call your local emergency services (911 in the [place] Do not drive yourself to the hospital.  Summary  Seizures are caused by abnormal electrical and chemical activity in the brain. The activity disrupts normal brain function and can cause various symptoms.  Seizures have many causes, including illness, head injuries, low levels of blood sugar or salt, and certain conditions.  Most seizures will stop on their own in less than 5 minutes. Seizures that last longer than 5 minutes are a medical emergency and need treatment right away.  Many medicines are used to treat seizures. Take over-the-counter and prescription medicines only as told by your health care provider.  This information is not intended to replace advice given to you by your health care provider. Make sure you discuss any questions you have with your health care provider.  Document Revised: 06/25/2021 Document Reviewed: 06/25/2021  Elsevier Patient Education © 2022 Else[employer] Inc.

## 2022-12-07 NOTE — PROGRESS NOTES
"Chief Complaint  Neurologic Problem    Subjective          Zahida Meng presents to [facility]  History of Present Illness  History of childhood seizures, started around age 2-3.  Full body convulsions.  Stopped taking AEDs around age 13.  Reports last seizure-like activity that was whole body convulsing was about 3 years ago.  States she's continuing to experiencing staring spells, near daily.  Will be able to hear sometimes but not respond.  Family has noticed.  Also endorses severe headaches, daily in nature, with more severe episodes 4-5 times per month. Endorses photophobia, phonophobia and nausea with the headaches.  Headaches can last all day.  Improved by sleep.  States she's experiencing paresthesia to bilateral feet.       Previous preventative migraine medications: lamictal, topiramate contraindicated d/t bipolar, TCAs contraindicated d/t med interactions,   Previous abortive migraine medications: None      Objective   Vital Signs:   /75   Pulse 115   Ht 165.1 cm (65\")   Wt (!) 137 kg (302 lb 9.6 oz)   BMI 50.36 kg/m²     Physical Exam  HENT:      Head: Normocephalic.   Pulmonary:      Effort: Pulmonary effort is normal.   Neurological:      Mental Status: She is alert and oriented to person, place, and time.      Cranial Nerves: Cranial nerves are intact.      Sensory: Sensation is intact.      Motor: Motor function is intact.      Coordination: Coordination is intact.      Deep Tendon Reflexes: Reflexes are normal and symmetric.        Neurologic Exam     Mental Status   Oriented to person, place, and time.        Result Review :               Assessment and Plan    Diagnoses and all orders for this visit:    1. Migraine without aura and without status migrainosus, not intractable (Primary)  Assessment & Plan:  Start propranolol for preventative therapy and Imitrex PRN for abortive therapy.        Orders:  -     EEG (Hospital Performed); " Future  -     CBC (No Diff); Future  -     Comprehensive Metabolic Panel; Future  -     Sedimentation Rate; Future  -     Vitamin B12 & Folate; Future  -     TSH; Future  -     T4, free; Future  -     Magnesium; Future  -     Methylmalonic Acid, Serum; Future  -     Protein Elec + Interp, Serum; Future  -     C-reactive Protein; Future    2. Staring episodes  Assessment & Plan:  Will order EEG for further evaluation.  Discussed routine seizure precautions including, but not limited to, avoiding bathing alone, swimming alone, climbing on ladders.  Also discussed need for medication compliance and risks of unmanaged epilepsy including status epilepticus, permanent brain injury or potentially death.  Patient is aware of [place] state law regarding no driving for 90 days following a seizure.      Orders:  -     EEG (Hospital Performed); Future  -     CBC (No Diff); Future  -     Comprehensive Metabolic Panel; Future  -     Sedimentation Rate; Future  -     Vitamin B12 & Folate; Future  -     TSH; Future  -     T4, free; Future  -     Magnesium; Future  -     Methylmalonic Acid, Serum; Future  -     Protein Elec + Interp, Serum; Future  -     C-reactive Protein; Future    3. History of seizures  -     EEG (Hospital Performed); Future  -     CBC (No Diff); Future  -     Comprehensive Metabolic Panel; Future  -     Sedimentation Rate; Future  -     Vitamin B12 & Folate; Future  -     TSH; Future  -     T4, free; Future  -     Magnesium; Future  -     Methylmalonic Acid, Serum; Future  -     Protein Elec + Interp, Serum; Future  -     C-reactive Protein; Future    4. Paresthesia  Assessment & Plan:  Will order labs for further evaluation.    Orders:  -     EEG (Hospital Performed); Future  -     CBC (No Diff); Future  -     Comprehensive Metabolic Panel; Future  -     Sedimentation Rate; Future  -     Vitamin B12 & Folate; Future  -     TSH; Future  -     T4, free; Future  -     Magnesium; Future  -     Methylmalonic Acid,  Serum; Future  -     Protein Elec + Interp, Serum; Future  -     C-reactive Protein; Future    Other orders  -     propranolol (INDERAL) 20 MG tablet; Take 1 tablet by mouth 2 (Two) Times a Day. 1 tablet BID  Dispense: 60 tablet; Refill: 3  -     SUMAtriptan (Imitrex) 50 MG tablet; Take 1 tablet by mouth 1 (One) Time As Needed for Migraine. Take one tablet at onset of headache. May repeat dose one time in 2 hours if headache not relieved.  Dispense: 9 tablet; Refill: 3      Follow Up   Return in about 3 months (around 3/7/2023) for Migraine f/u, seizure f/u.  Patient was given instructions and counseling regarding her condition or for health maintenance advice. Please see specific information pulled into the AVS if appropriate.

## 2022-12-09 PROBLEM — R20.2 PARESTHESIA: Status: ACTIVE | Noted: 2022-12-09

## 2022-12-09 PROBLEM — Z87.898 HISTORY OF SEIZURES: Status: ACTIVE | Noted: 2022-12-09

## 2022-12-09 PROBLEM — R40.4 STARING EPISODES: Status: ACTIVE | Noted: 2022-12-09

## 2022-12-09 NOTE — ASSESSMENT & PLAN NOTE
Will order EEG for further evaluation.  Discussed routine seizure precautions including, but not limited to, avoiding bathing alone, swimming alone, climbing on ladders.  Also discussed need for medication compliance and risks of unmanaged epilepsy including status epilepticus, permanent brain injury or potentially death.  Patient is aware of [place] state law regarding no driving for 90 days following a seizure.

## 2023-01-04 ENCOUNTER — HOSPITAL ENCOUNTER (OUTPATIENT)
Dept: NEUROLOGY | Facility: HOSPITAL | Age: 28
Discharge: HOME | End: 2023-01-04
Admitting: NURSE PRACTITIONER
Payer: COMMERCIAL

## 2023-01-04 DIAGNOSIS — R20.2 PARESTHESIA: ICD-10-CM

## 2023-01-04 DIAGNOSIS — Z87.898 HISTORY OF SEIZURES: ICD-10-CM

## 2023-01-04 DIAGNOSIS — G43.009 MIGRAINE WITHOUT AURA AND WITHOUT STATUS MIGRAINOSUS, NOT INTRACTABLE: ICD-10-CM

## 2023-01-04 DIAGNOSIS — R40.4 STARING EPISODES: ICD-10-CM

## 2023-01-04 PROCEDURE — 95819 EEG AWAKE AND ASLEEP: CPT

## 2023-01-09 ENCOUNTER — OFFICE VISIT (OUTPATIENT)
Dept: NEUROLOGY | Facility: CLINIC | Age: 28
End: 2023-01-09
Payer: COMMERCIAL

## 2023-01-09 VITALS
HEIGHT: 65 IN | HEART RATE: 132 BPM | WEIGHT: 293 LBS | BODY MASS INDEX: 48.82 KG/M2 | SYSTOLIC BLOOD PRESSURE: 124 MMHG | DIASTOLIC BLOOD PRESSURE: 82 MMHG

## 2023-01-09 DIAGNOSIS — G40.109 PARTIAL EPILEPSY ORIGINATING IN TEMPORAL LOBE: Primary | ICD-10-CM

## 2023-01-09 PROCEDURE — 99214 OFFICE O/P EST MOD 30 MIN: CPT | Performed by: NURSE PRACTITIONER

## 2023-01-09 RX ORDER — ARIPIPRAZOLE 5 MG/1
5 TABLET ORAL
COMMUNITY
Start: 2022-12-22

## 2023-01-09 RX ORDER — LAMOTRIGINE 100 MG/1
TABLET ORAL
Qty: 84 TABLET | Refills: 0 | Status: SHIPPED | OUTPATIENT
Start: 2023-01-09 | End: 2023-03-16

## 2023-01-09 RX ORDER — LAMOTRIGINE 100 MG/1
250 TABLET ORAL 2 TIMES DAILY
Qty: 150 TABLET | Refills: 3 | Status: SHIPPED | OUTPATIENT
Start: 2023-01-09 | End: 2023-03-16

## 2023-01-09 NOTE — PROGRESS NOTES
"Chief Complaint  Neurologic Problem    Subjective          Zahida Meng presents to [facility]  History of Present Illness  Following up to discuss EEG results.      Interval History:   History of childhood seizures, started around age 2-3.  Full body convulsions.  Stopped taking AEDs around age 13.  Reports last seizure-like activity that was whole body convulsing was about 3 years ago.  States she's continuing to experiencing staring spells, near daily.  Will be able to hear sometimes but not respond.  Family has noticed.  Also endorses severe headaches, daily in nature, with more severe episodes 4-5 times per month. Endorses photophobia, phonophobia and nausea with the headaches.  Headaches can last all day.  Improved by sleep.  States she's experiencing paresthesia to bilateral feet.       Previous preventative migraine medications: lamictal, topiramate contraindicated d/t bipolar, TCAs contraindicated d/t med interactions,   Previous abortive migraine medications: None      Objective   Vital Signs:   /82   Pulse (!) 132   Ht 165.1 cm (65\")   Wt 135 kg (298 lb 1.6 oz)   BMI 49.61 kg/m²     Physical Exam  HENT:      Head: Normocephalic.   Pulmonary:      Effort: Pulmonary effort is normal.   Neurological:      Mental Status: She is alert and oriented to person, place, and time.      Sensory: Sensation is intact.      Motor: Motor function is intact.      Coordination: Coordination is intact.      Deep Tendon Reflexes: Reflexes are normal and symmetric.        Neurologic Exam     Mental Status   Oriented to person, place, and time.        Result Review :             EEG: Abnormal EEG because of intermittent medium voltage single sharp discharges noted over the left mid temporal region suggestive of neuronal irritability and or epileptogenic activities in this region. Neuroradiographic imaging may be of some help to rule out pathology in the left " brain.    Assessment and Plan    Diagnoses and all orders for this visit:    1. 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  Will maximize the lamictal she is currently taking for a mood stabilizer for seizure prophylaxis.  If adequate seizure control is not obtained, will consider additional AEDs.  She is currently not cleared to drive until 90 days seizure free. She verbalized understanding.       Other orders  -     lamoTRIgine (LaMICtal) 100 MG tablet; Weeks 1&2: Take 1 tabs twice daily; Weeks 3&4: Take 2 tabs twice daily  Dispense: 84 tablet; Refill: 0  -     lamoTRIgine (LaMICtal) 100 MG tablet; Take 2.5 tablets by mouth 2 (Two) Times a Day for 30 days.  Dispense: 150 tablet; Refill: 3      Follow Up   No follow-ups on file.  Patient was given instructions and counseling regarding her condition or for health maintenance advice. Please see specific information pulled into the AVS if appropriate.

## 2023-01-12 PROBLEM — G40.109: Status: ACTIVE | Noted: 2023-01-12

## 2023-01-12 PROBLEM — Z87.898 HISTORY OF SEIZURES: Status: RESOLVED | Noted: 2022-12-09 | Resolved: 2023-01-12

## 2023-01-12 PROBLEM — R40.4 STARING EPISODES: Status: RESOLVED | Noted: 2022-12-09 | Resolved: 2023-01-12

## 2023-01-13 ENCOUNTER — PATIENT MESSAGE (OUTPATIENT)
Dept: NEUROLOGY | Facility: CLINIC | Age: 28
End: 2023-01-13
Payer: COMMERCIAL

## 2023-01-13 NOTE — ASSESSMENT & PLAN NOTE
EEG positive for left temporal epileptiform discharges.  Will maximize the lamictal she is currently taking for a mood stabilizer for seizure prophylaxis.  If adequate seizure control is not obtained, will consider additional AEDs.  She is currently not cleared to drive until 90 days seizure free. She verbalized understanding.

## 2023-01-24 ENCOUNTER — HOSPITAL ENCOUNTER (EMERGENCY)
Facility: HOSPITAL | Age: 28
Discharge: HOME | End: 2023-01-24
Attending: EMERGENCY MEDICINE | Admitting: EMERGENCY MEDICINE
Payer: COMMERCIAL

## 2023-01-24 VITALS
DIASTOLIC BLOOD PRESSURE: 99 MMHG | BODY MASS INDEX: 48.82 KG/M2 | SYSTOLIC BLOOD PRESSURE: 133 MMHG | TEMPERATURE: 98.6 F | RESPIRATION RATE: 16 BRPM | HEART RATE: 99 BPM | WEIGHT: 293 LBS | OXYGEN SATURATION: 98 % | HEIGHT: 65 IN
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
## 2023-01-24 PROCEDURE — 90471 IMMUNIZATION ADMIN: CPT

## 2023-01-24 PROCEDURE — 25010000002 TETANUS-DIPHTH-ACELL PERTUSSIS 5-2.5-18.5 LF-MCG/0.5 SUSPENSION PREFILLED SYRINGE

## 2023-01-24 PROCEDURE — 99283 EMERGENCY DEPT VISIT LOW MDM: CPT

## 2023-01-24 PROCEDURE — 90715 TDAP VACCINE 7 YRS/> IM: CPT

## 2023-01-24 RX ORDER — IBUPROFEN 400 MG/1
800 TABLET ORAL ONCE
Status: COMPLETED | OUTPATIENT
Start: 2023-01-24 | End: 2023-01-24

## 2023-01-24 RX ORDER — GINSENG 100 MG
1 CAPSULE ORAL 2 TIMES DAILY
Qty: 1 EACH | Refills: 0 | Status: SHIPPED | OUTPATIENT
Start: 2023-01-24

## 2023-01-24 RX ORDER — AMOXICILLIN AND CLAVULANATE POTASSIUM 875; 125 MG/1; MG/1
1 TABLET, FILM COATED ORAL 2 TIMES DAILY
Qty: 14 TABLET | Refills: 0 | Status: SHIPPED | OUTPATIENT
Start: 2023-01-24 | End: 2023-01-31

## 2023-01-24 RX ORDER — IBUPROFEN 800 MG/1
800 TABLET ORAL EVERY 6 HOURS PRN
Qty: 20 TABLET | Refills: 0 | Status: SHIPPED | OUTPATIENT
Start: 2023-01-24

## 2023-01-24 RX ADMIN — IBUPROFEN 800 MG: 400 TABLET, FILM COATED ORAL at 10:54

## 2023-01-24 RX ADMIN — TETANUS TOXOID, REDUCED DIPHTHERIA TOXOID AND ACELLULAR PERTUSSIS VACCINE, ADSORBED 0.5 ML: 5; 2.5; 8; 8; 2.5 SUSPENSION INTRAMUSCULAR at 10:46

## 2023-01-24 NOTE — ED PROVIDER NOTES
Time: 10:33 AM EST  Date of encounter:  2023  Independent Historian/Clinical History and Information was obtained by:   Patient  Chief Complaint: Dog bite    History is limited by: N/A    History of Present Illness:  Patient is a 27 y.o. year old female who presents to the emergency department for evaluation of dog bite to right hand this morning by a stray dog.  Patient is unsure if she is up-to-date on tetanus.  She denies any bleeding disorders.  Bleeding is controlled.    HPI    Patient Care Team  Primary Care Provider: Mecca English APRN    Past Medical History:     Allergies   Allergen Reactions   • Amoxicillin Hives   • Doxycycline Rash     Past Medical History:   Diagnosis Date   • Anxiety    • Bronchitis     chronic   • CTS (carpal tunnel syndrome)    • Depression    • Difficulty walking    • Head injury Years ago   • Headache, tension-type    • HL (hearing loss)    • Memory loss Short term memory loss   • Migraine    • Movement disorder    • Multiple personality (HCC)    • Peripheral neuropathy    • Seizures (HCC) Months old   • Vision loss      Past Surgical History:   Procedure Laterality Date   •  SECTION     • GALLBLADDER SURGERY     • UMBILICAL HERNIA REPAIR       Family History   Problem Relation Age of Onset   • Neuropathy Mother    • Migraines Father    • Stroke Maternal Grandmother        Home Medications:  Prior to Admission medications    Medication Sig Start Date End Date Taking? Authorizing Provider   albuterol sulfate  (90 Base) MCG/ACT inhaler Inhale 2 puffs As Needed. 22  Surjit Garsia MD   ARIPiprazole (ABILIFY) 5 MG tablet Take 5 mg by mouth every night at bedtime. 22   Surjit Garsia MD   cetirizine (zyrTEC) 10 MG tablet Take 10 mg by mouth Daily. 22   Surjit Garsia MD   cyclobenzaprine (FLEXERIL) 10 MG tablet Take 10 mg by mouth 3 (Three) Times a Day As Needed. 22   Surjit Garsia MD    lamoTRIgine (LaMICtal) 100 MG tablet Weeks 1&2: Take 1 tabs twice daily; Weeks 3&4: Take 2 tabs twice daily 1/9/23   Mecca English APRN   lamoTRIgine (LaMICtal) 100 MG tablet Take 2.5 tablets by mouth 2 (Two) Times a Day for 30 days. 1/9/23 2/8/23  Mecca English APRN   lamoTRIgine (LaMICtal) 25 MG tablet Take As Directed. 2 capsules BID 11/20/22   Surjit Garsia MD   medroxyPROGESTERone (DEPO-PROVERA) 150 MG/ML injection Inject 150 mg into the appropriate muscle as directed by prescriber Every 3 (Three) Months.    Surjit Garsia MD   meloxicam (MOBIC) 15 MG tablet Take 15 mg by mouth Daily. 8/29/22 8/30/23  Surjit Garsia MD   metFORMIN (GLUCOPHAGE) 500 MG tablet Take As Directed. 11/28/22   Surjit Garsia MD   propranolol (INDERAL) 20 MG tablet Take 1 tablet by mouth 2 (Two) Times a Day. 1 tablet BID 12/7/22   Mecca English APRN   rOPINIRole (REQUIP) 4 MG tablet Take 4 mg by mouth every night at bedtime. 11/30/22   Surjit Garsia MD   SUMAtriptan (Imitrex) 50 MG tablet Take 1 tablet by mouth 1 (One) Time As Needed for Migraine. Take one tablet at onset of headache. May repeat dose one time in 2 hours if headache not relieved. 12/7/22 12/7/23  Mecca English APRN   traZODone (DESYREL) 50 MG tablet Take 50 mg by mouth every night at bedtime. 10/26/22   Surjit Garsia MD   venlafaxine XR (EFFEXOR-XR) 150 MG 24 hr capsule Take 150 mg by mouth Daily. 11/23/22   Surjit Garsia MD        Social History:   Social History     Tobacco Use   • Smoking status: Every Day     Packs/day: 0.25     Years: 8.00     Pack years: 2.00     Types: Cigarettes, Electronic Cigarette   • Smokeless tobacco: Never   Vaping Use   • Vaping Use: Every day   Substance Use Topics   • Alcohol use: Not Currently     Comment: socially   • Drug use: Yes     Types: Marijuana     Comment: occasionally         Review of Systems:  Review of Systems   Constitutional: Negative.    HENT:  "Negative.    Eyes: Negative.    Respiratory: Negative.    Cardiovascular: Negative.    Gastrointestinal: Negative.    Endocrine: Negative.    Genitourinary: Negative.    Musculoskeletal: Negative.    Skin: Positive for wound (Lac).   Allergic/Immunologic: Negative.    Neurological: Negative.    Hematological: Negative.    Psychiatric/Behavioral: Negative.         Physical Exam:  /99 (BP Location: Left arm, Patient Position: Sitting)   Pulse 99   Temp 98.6 °F (37 °C) (Oral)   Resp 16   Ht 165.1 cm (65\")   Wt (!) 137 kg (302 lb 4 oz)   SpO2 98%   BMI 50.30 kg/m²     Physical Exam  Vitals and nursing note reviewed.   Constitutional:       Appearance: Normal appearance. She is normal weight.   HENT:      Head: Normocephalic and atraumatic.      Nose: Nose normal.      Mouth/Throat:      Mouth: Mucous membranes are moist.   Eyes:      Extraocular Movements: Extraocular movements intact.      Conjunctiva/sclera: Conjunctivae normal.      Pupils: Pupils are equal, round, and reactive to light.   Cardiovascular:      Rate and Rhythm: Normal rate and regular rhythm.      Heart sounds: Normal heart sounds.   Pulmonary:      Effort: Pulmonary effort is normal.      Breath sounds: Normal breath sounds.   Musculoskeletal:         General: Signs of injury present. Normal range of motion.      Right hand: Laceration present. Normal range of motion. Normal capillary refill. Normal pulse.        Hands:       Cervical back: Normal range of motion and neck supple.      Comments: 1.5 cm laceration to the posterior part of the distal wrist, bleeding controlled.   Skin:     General: Skin is warm and dry.   Neurological:      General: No focal deficit present.      Mental Status: She is alert and oriented to person, place, and time.   Psychiatric:         Mood and Affect: Mood normal.         Behavior: Behavior normal.                  Procedures:  Laceration Repair    Date/Time: 1/24/2023 11:21 AM  Performed by: Shirin" Dorothy CASPER PA-C  Authorized by: Blaine Nichols MD     Consent:     Consent obtained:  Verbal    Consent given by:  Patient    Risks discussed:  Infection, poor cosmetic result and pain  Universal protocol:     Patient identity confirmed:  Verbally with patient  Anesthesia:     Anesthesia method:  Local infiltration    Local anesthetic:  Lidocaine 1% WITH epi  Laceration details:     Location:  Hand    Hand location:  R wrist    Length (cm):  1.5  Pre-procedure details:     Preparation:  Patient was prepped and draped in usual sterile fashion and imaging obtained to evaluate for foreign bodies  Exploration:     Hemostasis achieved with:  Direct pressure    Wound exploration: wound explored through full range of motion    Treatment:     Area cleansed with:  Povidone-iodine and saline    Amount of cleaning:  Standard    Irrigation solution:  Sterile saline    Irrigation volume:  10 cc  Skin repair:     Repair method:  Steri-Strips    Number of Steri-Strips:  1  Approximation:     Approximation:  Loose  Repair type:     Repair type:  Simple  Post-procedure details:     Dressing:  Open (no dressing)    Procedure completion:  Tolerated well, no immediate complications          Medical Decision Making:      Comorbidities that affect care:    None    External Notes reviewed:    None      The following orders were placed and all results were independently analyzed by me:  Orders Placed This Encounter   Procedures   • Laceration Repair   • Irrigate wound  Misc Nursing Order (Specify)       Medications Given in the Emergency Department:  Medications   Tetanus-Diphth-Acell Pertussis (BOOSTRIX) injection 0.5 mL (0.5 mL Intramuscular Given 1/24/23 1046)   ibuprofen (ADVIL,MOTRIN) tablet 800 mg (800 mg Oral Given 1/24/23 1054)        ED Course:         Labs:    Lab Results (last 24 hours)     ** No results found for the last 24 hours. **           Imaging:    No Radiology Exams Resulted Within Past 24 Hours      Differential  Diagnosis and Discussion:    Laceration: Laceration was evaluated for arterial injury, ligamentous damage, and other neurovascular injury.        MDM         Patient Care Considerations:    xray R wrist, however full ROM      Consultants/Shared Management Plan:    None    Social Determinants of Health:    Patient is independent, reliable, and has access to care.       Disposition and Care Coordination:    Discharged: The patient is suitable and stable for discharge with no need for consideration of observation or admission.    I have explained discharge medications and the need for follow up with the patient/caretakers. This was also printed in the discharge instructions. Patient was discharged with the following medications and follow up:      Medication List      New Prescriptions    amoxicillin-clavulanate 875-125 MG per tablet  Commonly known as: AUGMENTIN  Take 1 tablet by mouth 2 (Two) Times a Day for 7 days.     bacitracin 500 UNIT/GM ointment  Apply 1 application topically to the appropriate area as directed 2 (Two) Times a Day.     ibuprofen 800 MG tablet  Commonly known as: ADVIL,MOTRIN  Take 1 tablet by mouth Every 6 (Six) Hours As Needed for Mild Pain.           Where to Get Your Medications      These medications [place] [facility] [place] [address] - [phone]  - [phone] FX  [address], [place] [address]    Phone: [phone]   · amoxicillin-clavulanate 875-125 MG per tablet  · bacitracin 500 UNIT/GM ointment  · ibuprofen 800 MG tablet      Mecca English, ANTHONY  [address]   [facility] [address]  [facility] [address]  [phone]             Final diagnoses:   Dog bite, initial encounter        ED Disposition     ED Disposition   Discharge    Condition   Stable    Comment   --             This medical record created using voice recognition software.           Dorothy Carpio PA-C  01/24/23 1125

## 2023-01-24 NOTE — DISCHARGE INSTRUCTIONS
Please clean wound twice daily, pat dry and apply bacitracin  Please take antibiotics as prescribed till finished  If any signs of swelling, erythema or or worsening pain please return to ED

## 2023-02-10 ENCOUNTER — LAB (OUTPATIENT)
Dept: LAB | Facility: HOSPITAL | Age: 28
End: 2023-02-10
Payer: COMMERCIAL

## 2023-02-10 DIAGNOSIS — G43.009 MIGRAINE WITHOUT AURA AND WITHOUT STATUS MIGRAINOSUS, NOT INTRACTABLE: ICD-10-CM

## 2023-02-10 DIAGNOSIS — R40.4 STARING EPISODES: ICD-10-CM

## 2023-02-10 DIAGNOSIS — Z87.898 HISTORY OF SEIZURES: ICD-10-CM

## 2023-02-10 DIAGNOSIS — R20.2 PARESTHESIA: ICD-10-CM

## 2023-02-10 LAB
ALBUMIN SERPL-MCNC: 4.6 G/DL (ref 3.5–5.2)
ALBUMIN/GLOB SERPL: 1.6 G/DL
ALP SERPL-CCNC: 66 U/L (ref 39–117)
ALT SERPL W P-5'-P-CCNC: 31 U/L (ref 1–33)
ANION GAP SERPL CALCULATED.3IONS-SCNC: 10 MMOL/L (ref 5–15)
AST SERPL-CCNC: 24 U/L (ref 1–32)
BILIRUB SERPL-MCNC: 0.2 MG/DL (ref 0–1.2)
BUN SERPL-MCNC: 16 MG/DL (ref 6–20)
BUN/CREAT SERPL: 25.8 (ref 7–25)
CALCIUM SPEC-SCNC: 9.5 MG/DL (ref 8.6–10.5)
CHLORIDE SERPL-SCNC: 106 MMOL/L (ref 98–107)
CO2 SERPL-SCNC: 25 MMOL/L (ref 22–29)
CREAT SERPL-MCNC: 0.62 MG/DL (ref 0.57–1)
CRP SERPL-MCNC: 0.48 MG/DL (ref 0–0.5)
DEPRECATED RDW RBC AUTO: 40.7 FL (ref 37–54)
EGFRCR SERPLBLD CKD-EPI 2021: 126.1 ML/MIN/1.73
ERYTHROCYTE [DISTWIDTH] IN BLOOD BY AUTOMATED COUNT: 12.5 % (ref 12.3–15.4)
ERYTHROCYTE [SEDIMENTATION RATE] IN BLOOD: 29 MM/HR (ref 0–20)
FOLATE SERPL-MCNC: 16.4 NG/ML (ref 4.78–24.2)
GLOBULIN UR ELPH-MCNC: 2.9 GM/DL
GLUCOSE SERPL-MCNC: 73 MG/DL (ref 65–99)
HCT VFR BLD AUTO: 40 % (ref 34–46.6)
HGB BLD-MCNC: 13.3 G/DL (ref 12–15.9)
MAGNESIUM SERPL-MCNC: 2.3 MG/DL (ref 1.6–2.6)
MCH RBC QN AUTO: 29.6 PG (ref 26.6–33)
MCHC RBC AUTO-ENTMCNC: 33.3 G/DL (ref 31.5–35.7)
MCV RBC AUTO: 88.9 FL (ref 79–97)
PLATELET # BLD AUTO: 323 10*3/MM3 (ref 140–450)
PMV BLD AUTO: 10.1 FL (ref 6–12)
POTASSIUM SERPL-SCNC: 4.6 MMOL/L (ref 3.5–5.2)
PROT SERPL-MCNC: 7.5 G/DL (ref 6–8.5)
RBC # BLD AUTO: 4.5 10*6/MM3 (ref 3.77–5.28)
SODIUM SERPL-SCNC: 141 MMOL/L (ref 136–145)
T4 FREE SERPL-MCNC: 1.28 NG/DL (ref 0.93–1.7)
TSH SERPL DL<=0.05 MIU/L-ACNC: 1.53 UIU/ML (ref 0.27–4.2)
VIT B12 BLD-MCNC: 1128 PG/ML (ref 211–946)
WBC NRBC COR # BLD: 8.45 10*3/MM3 (ref 3.4–10.8)

## 2023-02-10 PROCEDURE — 84439 ASSAY OF FREE THYROXINE: CPT

## 2023-02-10 PROCEDURE — 80053 COMPREHEN METABOLIC PANEL: CPT

## 2023-02-10 PROCEDURE — 82746 ASSAY OF FOLIC ACID SERUM: CPT

## 2023-02-10 PROCEDURE — 84443 ASSAY THYROID STIM HORMONE: CPT

## 2023-02-10 PROCEDURE — 85027 COMPLETE CBC AUTOMATED: CPT

## 2023-02-10 PROCEDURE — 82607 VITAMIN B-12: CPT

## 2023-02-10 PROCEDURE — 84165 PROTEIN E-PHORESIS SERUM: CPT

## 2023-02-10 PROCEDURE — 36415 COLL VENOUS BLD VENIPUNCTURE: CPT

## 2023-02-10 PROCEDURE — 83735 ASSAY OF MAGNESIUM: CPT

## 2023-02-10 PROCEDURE — 83921 ORGANIC ACID SINGLE QUANT: CPT

## 2023-02-10 PROCEDURE — 85652 RBC SED RATE AUTOMATED: CPT

## 2023-02-10 PROCEDURE — 86140 C-REACTIVE PROTEIN: CPT

## 2023-02-13 LAB
ALBUMIN SERPL ELPH-MCNC: 3.9 G/DL (ref 2.9–4.4)
ALBUMIN/GLOB SERPL: 1.1 {RATIO} (ref 0.7–1.7)
ALPHA1 GLOB SERPL ELPH-MCNC: 0.3 G/DL (ref 0–0.4)
ALPHA2 GLOB SERPL ELPH-MCNC: 0.9 G/DL (ref 0.4–1)
B-GLOBULIN SERPL ELPH-MCNC: 1.1 G/DL (ref 0.7–1.3)
GAMMA GLOB SERPL ELPH-MCNC: 1.3 G/DL (ref 0.4–1.8)
GLOBULIN SER CALC-MCNC: 3.5 G/DL (ref 2.2–3.9)
LABORATORY COMMENT REPORT: NORMAL
M PROTEIN SERPL ELPH-MCNC: NORMAL G/DL
PROT PATTERN SERPL ELPH-IMP: NORMAL
PROT SERPL-MCNC: 7.4 G/DL (ref 6–8.5)

## 2023-02-17 LAB — METHYLMALONATE SERPL-SCNC: 146 NMOL/L (ref 0–378)

## 2023-03-16 ENCOUNTER — OFFICE VISIT (OUTPATIENT)
Dept: NEUROLOGY | Facility: CLINIC | Age: 28
End: 2023-03-16
Payer: COMMERCIAL

## 2023-03-16 VITALS
SYSTOLIC BLOOD PRESSURE: 145 MMHG | BODY MASS INDEX: 48.82 KG/M2 | WEIGHT: 293 LBS | DIASTOLIC BLOOD PRESSURE: 80 MMHG | HEIGHT: 65 IN | HEART RATE: 107 BPM

## 2023-03-16 DIAGNOSIS — G40.109 PARTIAL EPILEPSY ORIGINATING IN TEMPORAL LOBE: Primary | ICD-10-CM

## 2023-03-16 PROCEDURE — 1159F MED LIST DOCD IN RCRD: CPT | Performed by: NURSE PRACTITIONER

## 2023-03-16 PROCEDURE — 99214 OFFICE O/P EST MOD 30 MIN: CPT | Performed by: NURSE PRACTITIONER

## 2023-03-16 PROCEDURE — 1160F RVW MEDS BY RX/DR IN RCRD: CPT | Performed by: NURSE PRACTITIONER

## 2023-03-16 RX ORDER — LAMOTRIGINE 200 MG/1
200 TABLET ORAL DAILY
Qty: 30 TABLET | Refills: 3 | Status: SHIPPED | OUTPATIENT
Start: 2023-03-16 | End: 2023-04-15

## 2023-03-16 RX ORDER — TRAZODONE HYDROCHLORIDE 100 MG/1
100 TABLET ORAL
COMMUNITY
Start: 2023-02-22

## 2023-03-16 RX ORDER — ACETAMINOPHEN AND CODEINE PHOSPHATE 300; 30 MG/1; MG/1
TABLET ORAL AS NEEDED
COMMUNITY
Start: 2023-03-07

## 2023-03-16 RX ORDER — CLINDAMYCIN HYDROCHLORIDE 300 MG/1
CAPSULE ORAL TAKE AS DIRECTED
COMMUNITY
Start: 2023-03-07

## 2023-03-16 NOTE — PROGRESS NOTES
"Chief Complaint  Neurologic Problem    Subjective          Zahida Meng presents to [facility]  History of Present Illness  Following up for seizure.  Denies seizure activity since previous visit.  However states that current dose of Lamictal is making her very fatigued.    Interval History:   History of childhood seizures, started around age 2-3.  Full body convulsions.  Stopped taking AEDs around age 13.  Reports last seizure-like activity that was whole body convulsing was about 3 years ago.  States she's continuing to experiencing staring spells, near daily.  Will be able to hear sometimes but not respond.  Family has noticed.  Also endorses severe headaches, daily in nature, with more severe episodes 4-5 times per month. Endorses photophobia, phonophobia and nausea with the headaches.  Headaches can last all day.  Improved by sleep.  States she's experiencing paresthesia to bilateral feet.       Previous preventative migraine medications: lamictal, topiramate contraindicated d/t bipolar, TCAs contraindicated d/t med interactions,   Previous abortive migraine medications: None      Objective   Vital Signs:   /80   Pulse 107   Ht 165.1 cm (65\")   Wt (!) 141 kg (311 lb 9.6 oz)   BMI 51.85 kg/m²     Physical Exam  HENT:      Head: Normocephalic.   Pulmonary:      Effort: Pulmonary effort is normal.   Neurological:      Mental Status: She is alert and oriented to person, place, and time.      Sensory: Sensation is intact.      Motor: Motor function is intact.      Coordination: Coordination is intact.      Deep Tendon Reflexes: Reflexes are normal and symmetric.        Neurologic Exam     Mental Status   Oriented to person, place, and time.        Result Review :               Assessment and Plan    Diagnoses and all orders for this visit:    1. 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  If she is unable to tolerate this dose we will consider other AED options.  Discussed routine seizure precautions including, but not limited to, avoiding bathing alone, swimming alone, climbing on ladders.  Also discussed need for medication compliance and risks of unmanaged epilepsy including status epilepticus, permanent brain injury or potentially death.  Patient is aware of [place] state law regarding no driving for 90 days following a seizure.        Other orders  -     lamoTRIgine (LaMICtal) 200 MG tablet; Take 1 tablet by mouth Daily for 30 days.  Dispense: 30 tablet; Refill: 3      Follow Up   Return in about 3 months (around 6/16/2023) for seizure f/u.  Patient was given instructions and counseling regarding her condition or for health maintenance advice. Please see specific information pulled into the AVS if appropriate.

## 2023-03-17 NOTE — ASSESSMENT & PLAN NOTE
Will reduce Lamictal to 200 mg twice daily.  If she is unable to tolerate this dose we will consider other AED options.  Discussed routine seizure precautions including, but not limited to, avoiding bathing alone, swimming alone, climbing on ladders.  Also discussed need for medication compliance and risks of unmanaged epilepsy including status epilepticus, permanent brain injury or potentially death.  Patient is aware of [place] state law regarding no driving for 90 days following a seizure.

## 2023-03-21 RX ORDER — SUMATRIPTAN 50 MG/1
TABLET, FILM COATED ORAL
Qty: 9 TABLET | Refills: 1 | Status: SHIPPED | OUTPATIENT
Start: 2023-03-21

## 2023-05-16 ENCOUNTER — APPOINTMENT (OUTPATIENT)
Dept: CT IMAGING | Facility: HOSPITAL | Age: 28
End: 2023-05-16
Payer: COMMERCIAL

## 2023-05-16 ENCOUNTER — HOSPITAL ENCOUNTER (EMERGENCY)
Facility: HOSPITAL | Age: 28
Discharge: HOME | End: 2023-05-16
Attending: EMERGENCY MEDICINE | Admitting: EMERGENCY MEDICINE
Payer: COMMERCIAL

## 2023-05-16 VITALS
WEIGHT: 293 LBS | HEIGHT: 65 IN | TEMPERATURE: 98.7 F | RESPIRATION RATE: 18 BRPM | SYSTOLIC BLOOD PRESSURE: 122 MMHG | BODY MASS INDEX: 48.82 KG/M2 | OXYGEN SATURATION: 100 % | DIASTOLIC BLOOD PRESSURE: 81 MMHG | HEART RATE: 99 BPM

## 2023-05-16 DIAGNOSIS — K52.9 COLITIS: Primary | ICD-10-CM

## 2023-05-16 LAB
ALBUMIN SERPL-MCNC: 4.2 G/DL (ref 3.5–5.2)
ALBUMIN/GLOB SERPL: 1.3 G/DL
ALP SERPL-CCNC: 68 U/L (ref 39–117)
ALT SERPL W P-5'-P-CCNC: 34 U/L (ref 1–33)
ANION GAP SERPL CALCULATED.3IONS-SCNC: 9.3 MMOL/L (ref 5–15)
AST SERPL-CCNC: 19 U/L (ref 1–32)
BASOPHILS # BLD AUTO: 0.05 10*3/MM3 (ref 0–0.2)
BASOPHILS NFR BLD AUTO: 0.5 % (ref 0–1.5)
BILIRUB SERPL-MCNC: <0.2 MG/DL (ref 0–1.2)
BILIRUB UR QL STRIP: NEGATIVE
BUN SERPL-MCNC: 20 MG/DL (ref 6–20)
BUN/CREAT SERPL: 32.3 (ref 7–25)
CALCIUM SPEC-SCNC: 9.6 MG/DL (ref 8.6–10.5)
CHLORIDE SERPL-SCNC: 102 MMOL/L (ref 98–107)
CLARITY UR: CLEAR
CO2 SERPL-SCNC: 28.7 MMOL/L (ref 22–29)
COLOR UR: YELLOW
CREAT SERPL-MCNC: 0.62 MG/DL (ref 0.57–1)
DEPRECATED RDW RBC AUTO: 39.5 FL (ref 37–54)
EGFRCR SERPLBLD CKD-EPI 2021: 125.4 ML/MIN/1.73
EOSINOPHIL # BLD AUTO: 0.14 10*3/MM3 (ref 0–0.4)
EOSINOPHIL NFR BLD AUTO: 1.5 % (ref 0.3–6.2)
ERYTHROCYTE [DISTWIDTH] IN BLOOD BY AUTOMATED COUNT: 12.4 % (ref 12.3–15.4)
GLOBULIN UR ELPH-MCNC: 3.2 GM/DL
GLUCOSE SERPL-MCNC: 94 MG/DL (ref 65–99)
GLUCOSE UR STRIP-MCNC: NEGATIVE MG/DL
HCG INTACT+B SERPL-ACNC: <0.5 MIU/ML
HCT VFR BLD AUTO: 41.3 % (ref 34–46.6)
HEMOCCULT STL QL IA: POSITIVE
HGB BLD-MCNC: 14 G/DL (ref 12–15.9)
HGB UR QL STRIP.AUTO: NEGATIVE
HOLD SPECIMEN: NORMAL
HOLD SPECIMEN: NORMAL
IMM GRANULOCYTES # BLD AUTO: 0.02 10*3/MM3 (ref 0–0.05)
IMM GRANULOCYTES NFR BLD AUTO: 0.2 % (ref 0–0.5)
KETONES UR QL STRIP: NEGATIVE
LEUKOCYTE ESTERASE UR QL STRIP.AUTO: NEGATIVE
LIPASE SERPL-CCNC: 21 U/L (ref 13–60)
LYMPHOCYTES # BLD AUTO: 1.97 10*3/MM3 (ref 0.7–3.1)
LYMPHOCYTES NFR BLD AUTO: 21 % (ref 19.6–45.3)
MCH RBC QN AUTO: 29.8 PG (ref 26.6–33)
MCHC RBC AUTO-ENTMCNC: 33.9 G/DL (ref 31.5–35.7)
MCV RBC AUTO: 87.9 FL (ref 79–97)
MONOCYTES # BLD AUTO: 0.56 10*3/MM3 (ref 0.1–0.9)
MONOCYTES NFR BLD AUTO: 6 % (ref 5–12)
NEUTROPHILS NFR BLD AUTO: 6.66 10*3/MM3 (ref 1.7–7)
NEUTROPHILS NFR BLD AUTO: 70.8 % (ref 42.7–76)
NITRITE UR QL STRIP: NEGATIVE
NRBC BLD AUTO-RTO: 0 /100 WBC (ref 0–0.2)
PH UR STRIP.AUTO: 7.5 [PH] (ref 5–8)
PLATELET # BLD AUTO: 281 10*3/MM3 (ref 140–450)
PMV BLD AUTO: 9.2 FL (ref 6–12)
POTASSIUM SERPL-SCNC: 4.1 MMOL/L (ref 3.5–5.2)
PROT SERPL-MCNC: 7.4 G/DL (ref 6–8.5)
PROT UR QL STRIP: NEGATIVE
RBC # BLD AUTO: 4.7 10*6/MM3 (ref 3.77–5.28)
SODIUM SERPL-SCNC: 140 MMOL/L (ref 136–145)
SP GR UR STRIP: >1.03 (ref 1–1.03)
UROBILINOGEN UR QL STRIP: ABNORMAL
WBC NRBC COR # BLD: 9.4 10*3/MM3 (ref 3.4–10.8)
WHOLE BLOOD HOLD COAG: NORMAL
WHOLE BLOOD HOLD SPECIMEN: NORMAL

## 2023-05-16 PROCEDURE — 25010000002 ONDANSETRON PER 1 MG: Performed by: NURSE PRACTITIONER

## 2023-05-16 PROCEDURE — 25010000002 HYDROMORPHONE 1 MG/ML SOLUTION: Performed by: NURSE PRACTITIONER

## 2023-05-16 PROCEDURE — 81003 URINALYSIS AUTO W/O SCOPE: CPT | Performed by: EMERGENCY MEDICINE

## 2023-05-16 PROCEDURE — 25510000001 IOPAMIDOL PER 1 ML: Performed by: EMERGENCY MEDICINE

## 2023-05-16 PROCEDURE — 83690 ASSAY OF LIPASE: CPT | Performed by: EMERGENCY MEDICINE

## 2023-05-16 PROCEDURE — 85025 COMPLETE CBC W/AUTO DIFF WBC: CPT

## 2023-05-16 PROCEDURE — 99283 EMERGENCY DEPT VISIT LOW MDM: CPT

## 2023-05-16 PROCEDURE — 82274 ASSAY TEST FOR BLOOD FECAL: CPT | Performed by: NURSE PRACTITIONER

## 2023-05-16 PROCEDURE — 84702 CHORIONIC GONADOTROPIN TEST: CPT | Performed by: EMERGENCY MEDICINE

## 2023-05-16 PROCEDURE — 96375 TX/PRO/DX INJ NEW DRUG ADDON: CPT

## 2023-05-16 PROCEDURE — 80053 COMPREHEN METABOLIC PANEL: CPT | Performed by: EMERGENCY MEDICINE

## 2023-05-16 PROCEDURE — 96374 THER/PROPH/DIAG INJ IV PUSH: CPT

## 2023-05-16 PROCEDURE — 74177 CT ABD & PELVIS W/CONTRAST: CPT

## 2023-05-16 RX ORDER — ONDANSETRON 2 MG/ML
4 INJECTION INTRAMUSCULAR; INTRAVENOUS ONCE
Status: COMPLETED | OUTPATIENT
Start: 2023-05-16 | End: 2023-05-16

## 2023-05-16 RX ORDER — SODIUM CHLORIDE 0.9 % (FLUSH) 0.9 %
10 SYRINGE (ML) INJECTION AS NEEDED
Status: DISCONTINUED | OUTPATIENT
Start: 2023-05-16 | End: 2023-05-16 | Stop reason: HOSPADM

## 2023-05-16 RX ORDER — PANTOPRAZOLE SODIUM 40 MG/10ML
40 INJECTION, POWDER, LYOPHILIZED, FOR SOLUTION INTRAVENOUS
Status: DISCONTINUED | OUTPATIENT
Start: 2023-05-17 | End: 2023-05-16

## 2023-05-16 RX ORDER — METRONIDAZOLE 500 MG/1
500 TABLET ORAL 3 TIMES DAILY
Qty: 21 TABLET | Refills: 0 | Status: SHIPPED | OUTPATIENT
Start: 2023-05-16

## 2023-05-16 RX ORDER — PANTOPRAZOLE SODIUM 40 MG/10ML
40 INJECTION, POWDER, LYOPHILIZED, FOR SOLUTION INTRAVENOUS ONCE
Status: COMPLETED | OUTPATIENT
Start: 2023-05-16 | End: 2023-05-16

## 2023-05-16 RX ORDER — DICYCLOMINE HCL 20 MG
20 TABLET ORAL EVERY 6 HOURS
Qty: 20 TABLET | Refills: 0 | Status: SHIPPED | OUTPATIENT
Start: 2023-05-16

## 2023-05-16 RX ORDER — ONDANSETRON 4 MG/1
4 TABLET, ORALLY DISINTEGRATING ORAL EVERY 8 HOURS PRN
Qty: 15 TABLET | Refills: 0 | Status: SHIPPED | OUTPATIENT
Start: 2023-05-16

## 2023-05-16 RX ORDER — CIPROFLOXACIN 500 MG/1
500 TABLET, FILM COATED ORAL EVERY 12 HOURS
Qty: 20 TABLET | Refills: 0 | Status: SHIPPED | OUTPATIENT
Start: 2023-05-16

## 2023-05-16 RX ADMIN — SODIUM CHLORIDE 1000 ML: 9 INJECTION, SOLUTION INTRAVENOUS at 17:55

## 2023-05-16 RX ADMIN — HYDROMORPHONE HYDROCHLORIDE 0.5 MG: 1 INJECTION, SOLUTION INTRAMUSCULAR; INTRAVENOUS; SUBCUTANEOUS at 17:55

## 2023-05-16 RX ADMIN — IOPAMIDOL 94 ML: 755 INJECTION, SOLUTION INTRAVENOUS at 19:02

## 2023-05-16 RX ADMIN — ONDANSETRON 4 MG: 2 INJECTION INTRAMUSCULAR; INTRAVENOUS at 17:55

## 2023-05-16 RX ADMIN — PANTOPRAZOLE SODIUM 40 MG: 40 INJECTION, POWDER, FOR SOLUTION INTRAVENOUS at 17:55

## 2023-05-16 NOTE — ED PROVIDER NOTES
Time: 5:17 PM EDT  Date of encounter:  2023  Independent Historian/Clinical History and Information was obtained by:   Patient  Chief Complaint   Patient presents with   • Abdominal Pain   • Black or Bloody Stool       History is limited by: N/A    History of Present Illness:  Patient is a 28 y.o. year old female who presents to the emergency department for evaluation of abd pain, nausea, vomiting and bloody stool. Symptoms began today. Patient reports multiple episodes of vomiting as well as bloody diarrhea. Patient reports decreased po intake. Denies fever/chills.  Patient denies fever and chills.  Patient has no chest pain or shortness of breath.  Patient has no cough hemoptysis.  Patient denies dysuria and urinary frequency.  Patient denies questionable ingestions.    HPI    Patient Care Team  Primary Care Provider: Mari Contreras APRN    Past Medical History:     Allergies   Allergen Reactions   • Amoxicillin Hives   • Doxycycline Rash     Past Medical History:   Diagnosis Date   • Anxiety    • Bronchitis     chronic   • CTS (carpal tunnel syndrome)    • Depression    • Difficulty walking    • Head injury Years ago   • Headache, tension-type    • HL (hearing loss)    • Memory loss Short term memory loss   • Migraine    • Movement disorder    • Multiple personality    • Peripheral neuropathy    • Seizures Months old   • Vision loss      Past Surgical History:   Procedure Laterality Date   •  SECTION     • GALLBLADDER SURGERY     • UMBILICAL HERNIA REPAIR       Family History   Problem Relation Age of Onset   • Neuropathy Mother    • Migraines Father    • Stroke Maternal Grandmother        Home Medications:  Prior to Admission medications    Medication Sig Start Date End Date Taking? Authorizing Provider   acetaminophen-codeine (TYLENOL #3) 300-30 MG per tablet As Needed. 3/7/23   Provider, MD Surjit   albuterol sulfate  (90 Base) MCG/ACT inhaler Inhale 2 puffs As Needed.  8/29/22 8/30/23  Surjit Garsia MD   ARIPiprazole (ABILIFY) 5 MG tablet Take 1 tablet by mouth every night at bedtime. 12/22/22   Surjit Garsia MD   bacitracin 500 UNIT/GM ointment Apply 1 application topically to the appropriate area as directed 2 (Two) Times a Day. 1/24/23   Dorothy Carpio PA-C   cetirizine (zyrTEC) 10 MG tablet Take 1 tablet by mouth Daily. 11/30/22   Surjit Garsia MD   clindamycin (CLEOCIN) 300 MG capsule Take As Directed. 3/7/23   Surjit Garsia MD   cyclobenzaprine (FLEXERIL) 10 MG tablet Take 1 tablet by mouth 3 (Three) Times a Day As Needed. 11/30/22   Surjit Garsia MD   ibuprofen (ADVIL,MOTRIN) 800 MG tablet Take 1 tablet by mouth Every 6 (Six) Hours As Needed for Mild Pain. 1/24/23   Dorothy Carpio PA-C   lamoTRIgine (LaMICtal) 200 MG tablet Take 1 tablet by mouth Daily for 30 days. 3/16/23 4/15/23  Mecca English APRN   medroxyPROGESTERone (DEPO-PROVERA) 150 MG/ML injection Inject 1 mL into the appropriate muscle as directed by prescriber Every 3 (Three) Months.    Surjit Garsia MD   meloxicam (MOBIC) 15 MG tablet Take 1 tablet by mouth Daily. 8/29/22 8/30/23  Surjit Garsia MD   metFORMIN (GLUCOPHAGE) 500 MG tablet Take As Directed.  Patient not taking: Reported on 3/16/2023 11/28/22   Surjit Garsia MD   propranolol (INDERAL) 20 MG tablet Take 1 tablet by mouth 2 (Two) Times a Day. 1 tablet BID 12/7/22   Mecca English APRN   rOPINIRole (REQUIP) 4 MG tablet Take 1 tablet by mouth every night at bedtime. 11/30/22   Surjit Garsia MD   SUMAtriptan (IMITREX) 50 MG tablet TAKE one tablet AT ONSET OF HEADACHE. MAY REPEAT DOSE one TIME IN 2 hours IF HEADACHE not relieved.] 3/21/23   Mecca English APRN   traZODone (DESYREL) 100 MG tablet Take 1 tablet by mouth every night at bedtime. 2/22/23   Provider, MD Surjit   venlafaxine XR (EFFEXOR-XR) 150 MG 24 hr capsule Take 1 capsule by mouth Daily.  "11/23/22   Provider, MD Surjit        Social History:   Social History     Tobacco Use   • Smoking status: Every Day     Packs/day: 0.25     Years: 8.00     Pack years: 2.00     Types: Cigarettes, Electronic Cigarette   • Smokeless tobacco: Never   Vaping Use   • Vaping Use: Every day   Substance Use Topics   • Alcohol use: Not Currently     Comment: socially   • Drug use: Yes     Types: Marijuana     Comment: occasionally         Review of Systems:  Review of Systems   Constitutional: Negative for chills and fever.   HENT: Negative for congestion, rhinorrhea and sore throat.    Eyes: Negative for pain and visual disturbance.   Respiratory: Negative for apnea, cough, chest tightness and shortness of breath.    Cardiovascular: Negative for chest pain and palpitations.   Gastrointestinal: Positive for abdominal pain, blood in stool, diarrhea, nausea and vomiting.   Genitourinary: Negative for difficulty urinating and dysuria.   Musculoskeletal: Negative for joint swelling and myalgias.   Skin: Negative for color change.   Neurological: Negative for seizures and headaches.   Psychiatric/Behavioral: Negative.    All other systems reviewed and are negative.       Physical Exam:  /81   Pulse 99   Temp 98.7 °F (37.1 °C) (Oral)   Resp 18   Ht 165.1 cm (65\")   Wt (!) 151 kg (333 lb 1.8 oz)   SpO2 100%   BMI 55.43 kg/m²     Physical Exam  Vitals and nursing note reviewed.   Constitutional:       General: She is not in acute distress.     Appearance: Normal appearance. She is not toxic-appearing.   HENT:      Head: Normocephalic and atraumatic.      Jaw: There is normal jaw occlusion.      Mouth/Throat:      Mouth: Mucous membranes are moist.   Eyes:      General: Lids are normal.      Extraocular Movements: Extraocular movements intact.      Conjunctiva/sclera: Conjunctivae normal.      Pupils: Pupils are equal, round, and reactive to light.   Cardiovascular:      Rate and Rhythm: Normal rate and regular " rhythm.      Pulses: Normal pulses.      Heart sounds: Normal heart sounds.   Pulmonary:      Effort: Pulmonary effort is normal. No respiratory distress.      Breath sounds: Normal breath sounds. No wheezing or rhonchi.   Abdominal:      General: Abdomen is flat.      Palpations: Abdomen is soft.      Tenderness: There is no abdominal tenderness. There is no guarding or rebound.   Musculoskeletal:         General: Normal range of motion.      Cervical back: Normal range of motion and neck supple.      Right lower leg: No edema.      Left lower leg: No edema.   Skin:     General: Skin is warm and dry.   Neurological:      General: No focal deficit present.      Mental Status: She is alert and oriented to person, place, and time. Mental status is at baseline.   Psychiatric:         Mood and Affect: Mood normal.         Behavior: Behavior normal.                  Procedures:  Procedures      Medical Decision Making:      Comorbidities that affect care:    None    External Notes reviewed:    Previous Clinic Note: Patient was last seen by neurology for partial epilepsy.      The following orders were placed and all results were independently analyzed by me:  Orders Placed This Encounter   Procedures   • CT Abdomen Pelvis With Contrast   • Carmen Draw   • Comprehensive Metabolic Panel   • Lipase   • Urinalysis With Microscopic If Indicated (No Culture) - Urine, Clean Catch   • hCG, Quantitative, Pregnancy   • CBC Auto Differential   • Occult Blood, Fecal By Immunoassay - Stool, Per Rectum   • NPO Diet NPO Type: Strict NPO   • Undress & Gown   • Insert Peripheral IV   • Insert Peripheral IV   • CBC & Differential   • Green Top (Gel)   • Lavender Top   • Gold Top - SST   • Light Blue Top       Medications Given in the Emergency Department:  Medications   sodium chloride 0.9 % flush 10 mL (has no administration in time range)   sodium chloride 0.9 % flush 10 mL (has no administration in time range)   sodium chloride 0.9 %  bolus 1,000 mL (1,000 mL Intravenous New Bag 5/16/23 1755)   ondansetron (ZOFRAN) injection 4 mg (4 mg Intravenous Given 5/16/23 1755)   HYDROmorphone (DILAUDID) injection 0.5 mg (0.5 mg Intravenous Given 5/16/23 1755)   pantoprazole (PROTONIX) injection 40 mg (40 mg Intravenous Given 5/16/23 1755)   iopamidol (ISOVUE-370) 76 % injection 100 mL (94 mL Intravenous Given 5/16/23 1902)        ED Course:    The patient was initially evaluated in the triage area where orders were placed. The patient was later dispositioned by Blaine Nichols MD.      The patient was advised to stay for completion of workup which includes but is not limited to communication of labs and radiological results, reassessment and plan. The patient was advised that leaving prior to disposition by a provider could result in critical findings that are not communicated to the patient.     ED Course as of 05/16/23 2009   Tue May 16, 2023   1717 The patient was seen and examined by Alisia turner APRN, while in triage. Orders placed. Patient is awaiting disposition.   [AR]      ED Course User Index  [AR] Alisia Dawson APRN       Labs:    Lab Results (last 24 hours)     Procedure Component Value Units Date/Time    CBC & Differential [[id number]]  (Normal) Collected: 05/16/23 1749    Specimen: Blood Updated: 05/16/23 1756    Narrative:      The following orders were created for panel order CBC & Differential.  Procedure                               Abnormality         Status                     ---------                               -----------         ------                     CBC Auto Differential[[id number]]        Normal              Final result                 Please view results for these tests on the individual orders.    Comprehensive Metabolic Panel [[id number]]  (Abnormal) Collected: 05/16/23 1749    Specimen: Blood Updated: 05/16/23 1816     Glucose 94 mg/dL      BUN 20 mg/dL      Creatinine 0.62 mg/dL      Sodium 140 mmol/L       Potassium 4.1 mmol/L      Chloride 102 mmol/L      CO2 28.7 mmol/L      Calcium 9.6 mg/dL      Total Protein 7.4 g/dL      Albumin 4.2 g/dL      ALT (SGPT) 34 U/L      AST (SGOT) 19 U/L      Alkaline Phosphatase 68 U/L      Total Bilirubin <0.2 mg/dL      Globulin 3.2 gm/dL      A/G Ratio 1.3 g/dL      BUN/Creatinine Ratio 32.3     Anion Gap 9.3 mmol/L      eGFR 125.4 mL/min/1.73     Narrative:      GFR Normal >60  Chronic Kidney Disease <60  Kidney Failure <15      Lipase [[id number]]  (Normal) Collected: 05/16/23 1749    Specimen: Blood Updated: 05/16/23 1816     Lipase 21 U/L     hCG, Quantitative, Pregnancy [[id number]] Collected: 05/16/23 1749    Specimen: Blood Updated: 05/16/23 1814     HCG Quantitative <0.50 mIU/mL     Narrative:      HCG Ranges by Gestational Age    Females - non-pregnant premenopausal   </= 1mIU/mL HCG  Females - postmenopausal               </= 7mIU/mL HCG    3 Weeks       5.4   -      72 mIU/mL  4 Weeks      10.2   -     708 mIU/mL  5 Weeks       217   -   8,245 mIU/mL  6 Weeks       152   -  32,177 mIU/mL  7 Weeks     4,059   - 153,767 mIU/mL  8 Weeks    31,366   - 149,094 mIU/mL  9 Weeks    59,109   - 135,901 mIU/mL  10 Weeks   44,186   - 170,409 mIU/mL  12 Weeks   27,107   - 201,615 mIU/mL  14 Weeks   24,302   -  93,646 mIU/mL  15 Weeks   12,540   -  69,747 mIU/mL  16 Weeks    8,904   -  55,332 mIU/mL  17 Weeks    8,240   -  51,793 mIU/mL  18 Weeks    9,649   -  55,271 mIU/mL      CBC Auto Differential [[id number]]  (Normal) Collected: 05/16/23 1749    Specimen: Blood Updated: 05/16/23 1756     WBC 9.40 10*3/mm3      RBC 4.70 10*6/mm3      Hemoglobin 14.0 g/dL      Hematocrit 41.3 %      MCV 87.9 fL      MCH 29.8 pg      MCHC 33.9 g/dL      RDW 12.4 %      RDW-SD 39.5 fl      MPV 9.2 fL      Platelets 281 10*3/mm3      Neutrophil % 70.8 %      Lymphocyte % 21.0 %      Monocyte % 6.0 %      Eosinophil % 1.5 %      Basophil % 0.5 %      Immature Grans % 0.2 %      Neutrophils, Absolute  6.66 10*3/mm3      Lymphocytes, Absolute 1.97 10*3/mm3      Monocytes, Absolute 0.56 10*3/mm3      Eosinophils, Absolute 0.14 10*3/mm3      Basophils, Absolute 0.05 10*3/mm3      Immature Grans, Absolute 0.02 10*3/mm3      nRBC 0.0 /100 WBC     Occult Blood, Fecal By Immunoassay - Stool, Per Rectum [[id number]]  (Abnormal) Collected: 05/16/23 1939    Specimen: Stool from Per Rectum Updated: 05/16/23 2000     Occult Blood, Fecal by Immunoassay Positive    Urinalysis With Microscopic If Indicated (No Culture) - Urine, Clean Catch [[id number]]  (Abnormal) Collected: 05/16/23 1940    Specimen: Urine, Clean Catch Updated: 05/16/23 1958     Color, UA Yellow     Appearance, UA Clear     pH, UA 7.5     Specific Gravity, UA >1.030     Glucose, UA Negative     Ketones, UA Negative     Bilirubin, UA Negative     Blood, UA Negative     Protein, UA Negative     Leuk Esterase, UA Negative     Nitrite, UA Negative     Urobilinogen, UA 0.2 E.U./dL    Narrative:      Urine microscopic not indicated.           Imaging:    CT Abdomen Pelvis With Contrast    Result Date: 5/16/2023  PROCEDURE: CT ABDOMEN PELVIS W CONTRAST  COMPARISON: [facility], CT, CT ABDOMEN PELVIS W CONTRAST, 9/30/2021, 20:01.  INDICATIONS: MID ABDOMINAL PAIN AND BLOOD IN STOOL X TODAY  PROTOCOL:   Standard imaging protocol performed    RADIATION:   DLP: 1943.7 mGy*cm   Automated exposure control was utilized to minimize radiation dose. CONTRAST: 94 cc Isovue 370 I.V. LABS:   eGFR: 125.4 ml/min/1.73m2  TECHNIQUE: Axial images of the abdomen and pelvis with intravenous contrast.  ABDOMEN:  The lung bases are clear.  Prior cholecystectomy.  The liver, spleen, pancreas, adrenal glands and kidneys are normal.  PELVIS:  The appendix and terminal ileum are normal.  No evidence of bowel obstruction, perforation or abscess.  The uterus and adnexa have a normal CT appearance.  Pelvic phleboliths are present.  No CT evidence of colitis.  The abdominal aorta has a  normal caliber.  No acute osseous abnormalities are identified.  IMPRESSION:  No acute findings.  LATISHA WAKEFIELD MD       Electronically Signed and Approved By: LATISHA WAKEFIELD MD on 5/16/2023 at 19:20                 Differential Diagnosis and Discussion:      Abdominal Pain: Based on the patient's signs and symptoms, I considered abdominal aortic aneurysm, small bowel obstruction, pancreatitis, acute cholecystitis, acute appendecitis, peptic ulcer disease, gastritis, colitis, endocrine disorders, irritable bowel syndrome and other differential diagnosis an etiology of the patient's abdominal pain.  Diarrhea: Differential diagnosis includes but is not limited to malabsorption syndrome, bacterial infection, carcinoid syndrome, pancreatic hypersecretion, viral infection, celiac sprue, Crohn's disease, ulcerative colitis, ischemic colitis, colitis, hypermotility, and irritable bowel syndrome.    All labs were reviewed and interpreted by me.  CT scan radiology impression was interpreted by me.    MDM  Number of Diagnoses or Management Options     Amount and/or Complexity of Data Reviewed  Clinical lab tests: reviewed  Tests in the radiology section of CPT®: reviewed      Decision making regarding discharge:    The differential diagnosis for bloody diarrhea and this 28-year-old female is quite diverse and includes a variety of gastrointestinal conditions that can cause this symptom. Here are some possible causes:    Infectious causes: These include bacterial infections such as Shigella, Salmonella, Campylobacter, or E. coli, as well as parasitic infections like Entamoeba histolytica.    Inflammatory Bowel Disease (IBD): This category includes Crohn's disease and ulcerative colitis, both chronic conditions that can cause bloody diarrhea.    Diverticular disease: Diverticulosis can sometimes lead to diverticulitis, an inflammation or infection of the diverticula, which can present with bloody diarrhea.    Hemorrhoids:  These swollen blood vessels in the rectum or anus can sometimes cause bloody diarrhea.    Anal fissures: These small tears in the lining of the anus can also cause bloody diarrhea.    Ischemic colitis: This condition, caused by reduced blood flow to the colon, can lead to bloody diarrhea.    Colorectal cancer: While not as common, cancer of the colon or rectum can present with bloody diarrhea.    A normal CT scan of the abdomen and pelvis can help rule out several entities in the differential diagnosis that would result in observable changes in the abdominal and pelvic structures. These include:    Diverticular disease: A normal CT scan would typically rule out diverticulitis, as this condition would generally cause visible changes in the colon.    Inflammatory Bowel Disease: While some mild cases might not be visible on CT, moderate to severe Crohn's disease or ulcerative colitis would typically cause changes in the bowel wall that can be seen on CT.    Ischemic colitis: A normal CT scan would likely rule out ischemic colitis, as this condition can cause changes in the bowel wall that would typically be visible on CT.    Colorectal cancer: A normal CT scan would likely rule out advanced colorectal cancer, as tumors would typically be visible on the scan.      The patient´s CBC that was reviewed and interpreted by me shows no abnormalities of critical concern. Of note, there is no anemia requiring a blood transfusion and the platelet count is acceptable.  The patient´s CMP that was reviewed and interpretted by me shows no abnormalities of critical concern. Of note, the patient´s sodium and potassium are acceptable. The patient´s liver enzymes are unremarkable. The patient´s renal function (creatinine) is preserved. The patient has a normal anion gap.  Urinalysis is negative for bacteriuria.  Occult blood is positive.  CT scan the abdomen pelvis is negative for acute intra-abdominal pathology.  The patient is resting  comfortably and feels better, is alert and in no distress. Repeat examination is unremarkable and benign; in particular, there's no discomfort at McBurney's point and there is no pulsatile mass. The history, exam, diagnostic testing, and current condition does not suggest acute appendicitis, bowel obstruction, acute cholecystitis, bowel perforation, major gastrointestinal bleeding, severe diverticulitis, abdominal aortic aneurysm, mesenteric ischemia, volvulus, sepsis, or other significant pathology that warrants further testing, continued ED treatment, admission, for surgical evaluation at this point. The vital signs have been stable. The patient does not have uncontrollable pain, intractable vomiting, or other significant symptoms. The patient's condition is stable and appropriate for discharge from the emergency department.         Patient Care Considerations:    NARCOTICS: I considered prescribing opiate pain medication as an outpatient, however CT scan shows no acute pathology and the patient's pain should be controlled with Bentyl.      Consultants/Shared Management Plan:    None    Social Determinants of Health:    Patient is independent, reliable, and has access to care.       Disposition and Care Coordination:    Discharged: I considered escalation of care by admitting this patient for observation, however the patient has improved and is suitable and  stable for discharge.        Final diagnoses:   Colitis        ED Disposition     ED Disposition   Discharge    Condition   Stable    Comment   --             This medical record created using voice recognition software.           Blaine Nichols MD  05/16/23 2011

## 2023-07-24 ENCOUNTER — OFFICE VISIT (OUTPATIENT)
Dept: NEUROLOGY | Facility: CLINIC | Age: 28
End: 2023-07-24
Payer: COMMERCIAL

## 2023-07-24 VITALS
SYSTOLIC BLOOD PRESSURE: 139 MMHG | DIASTOLIC BLOOD PRESSURE: 87 MMHG | BODY MASS INDEX: 48.82 KG/M2 | HEIGHT: 65 IN | HEART RATE: 112 BPM | WEIGHT: 293 LBS

## 2023-07-24 DIAGNOSIS — G40.109 PARTIAL EPILEPSY ORIGINATING IN TEMPORAL LOBE: Primary | ICD-10-CM

## 2023-07-24 DIAGNOSIS — G43.009 MIGRAINE WITHOUT AURA AND WITHOUT STATUS MIGRAINOSUS, NOT INTRACTABLE: ICD-10-CM

## 2023-07-24 RX ORDER — LAMOTRIGINE 200 MG/1
200 TABLET ORAL DAILY
Qty: 90 TABLET | Refills: 5 | Status: SHIPPED | OUTPATIENT
Start: 2023-07-24 | End: 2023-08-23

## 2023-07-24 NOTE — PROGRESS NOTES
"Chief Complaint  Seizures    Subjective          Zahida Meng presents to [facility]  History of Present Illness  Following up for seizure.  Denies seizure activity since previous visit.  Doing better with Lamictal 200mg. Denies side effects. Headaches improved with propranolol.  Imitrex helpful abortive therapy.    Interval History:   History of childhood seizures, started around age 2-3.  Full body convulsions.  Stopped taking AEDs around age 13.  Reports last seizure-like activity that was whole body convulsing was about 3 years ago.  States she's continuing to experiencing staring spells, near daily.  Will be able to hear sometimes but not respond.  Family has noticed.  Also endorses severe headaches, daily in nature, with more severe episodes 4-5 times per month. Endorses photophobia, phonophobia and nausea with the headaches.  Headaches can last all day.  Improved by sleep.  States she's experiencing paresthesia to bilateral feet.       Previous preventative migraine medications: lamictal, topiramate contraindicated d/t bipolar, TCAs contraindicated d/t med interactions,   Previous abortive migraine medications: None    Objective   Vital Signs:   /87   Pulse 112   Ht 165.1 cm (65\")   Wt (!) 157 kg (347 lb 1.6 oz)   BMI 57.76 kg/m²     Physical Exam  HENT:      Head: Normocephalic.   Pulmonary:      Effort: Pulmonary effort is normal.   Neurological:      Mental Status: She is alert and oriented to person, place, and time.      Sensory: Sensation is intact.      Motor: Motor function is intact.      Coordination: Coordination is intact.      Deep Tendon Reflexes: Reflexes are normal and symmetric.      Neurologic Exam     Mental Status   Oriented to person, place, and time.      Result Review :               Assessment and Plan    Diagnoses and all orders for this visit:    1. 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  In general, we recommend using good judgement when you are doing certain activities and to avoid those activities that if you were to have a seizure, you could harm yourself or others. In the [facility], it is the law that you cannot drive within 90 days of a seizure. We also recommend not standing over open flames, not getting on high ladders or the roof, not swimming or taking baths by yourself (showers are ok) and not operating heavy machinery or power tools.         2. Migraine without aura and without status migrainosus, not intractable  Assessment & Plan:  Continue propranolol for preventative therapy and Imitrex PRN for abortive therapy.          Other orders  -     lamoTRIgine (LaMICtal) 200 MG tablet; Take 1 tablet by mouth Daily for 30 days.  Dispense: 90 tablet; Refill: 5  -     propranolol (INDERAL) 20 MG tablet; Take 1 tablet by mouth 2 (Two) Times a Day. 1 tablet BID  Dispense: 180 tablet; Refill: 1  -     SUMAtriptan (IMITREX) 50 MG tablet; Take 1 tablet by mouth Every 2 (Two) Hours As Needed for Migraine. Take one tablet at onset of headache. May repeat dose one time in 2 hours if headache not relieved.  Dispense: 9 tablet; Refill: 5        Follow Up   Return in about 1 year (around 7/24/2024) for seizure f/u.  Patient was given instructions and counseling regarding her condition or for health maintenance advice. Please see specific information pulled into the AVS if appropriate.

## 2023-07-26 RX ORDER — SUMATRIPTAN 50 MG/1
50 TABLET, FILM COATED ORAL
Qty: 9 TABLET | Refills: 5 | Status: SHIPPED | OUTPATIENT
Start: 2023-07-26

## 2023-07-26 RX ORDER — PROPRANOLOL HYDROCHLORIDE 20 MG/1
20 TABLET ORAL 2 TIMES DAILY
Qty: 180 TABLET | Refills: 1 | Status: SHIPPED | OUTPATIENT
Start: 2023-07-26

## 2023-07-26 NOTE — ASSESSMENT & PLAN NOTE
Continue Lamictal  200 mg twice daily.  In general, we recommend using good judgement when you are doing certain activities and to avoid those activities that if you were to have a seizure, you could harm yourself or others. In the [facility], it is the law that you cannot drive within 90 days of a seizure. We also recommend not standing over open flames, not getting on high ladders or the roof, not swimming or taking baths by yourself (showers are ok) and not operating heavy machinery or power tools.

## 2023-07-26 NOTE — PATIENT INSTRUCTIONS
Migraine Headache  A migraine headache is an intense, throbbing pain on one side or both sides of the head. Migraine headaches may also cause other symptoms, such as nausea, vomiting, and sensitivity to light and noise. A migraine headache can last from 4 hours to 3 days. Talk with your doctor about what things may bring on (trigger) your migraine headaches.  What are the causes?  The exact cause of this condition is not known. However, a migraine may be caused when nerves in the brain become irritated and release chemicals that cause inflammation of blood vessels. This inflammation causes pain. This condition may be triggered or caused by:  Drinking alcohol.  Smoking.  Taking medicines, such as:  Medicine used to treat chest pain (nitroglycerin).  Birth control pills.  Estrogen.  Certain blood pressure medicines.  Eating or drinking products that contain nitrates, glutamate, aspartame, or tyramine. Aged cheeses, chocolate, or caffeine may also be triggers.  Doing physical activity.  Other things that may trigger a migraine headache include:  Menstruation.  Pregnancy.  Hunger.  Stress.  Lack of sleep or too much sleep.  Weather changes.  Fatigue.  What increases the risk?  The following factors may make you more likely to experience migraine headaches:  Being a certain age. This condition is more common in people who are 25-55 years old.  Being female.  Having a family history of migraine headaches.  Being .  Having a mental health condition, such as depression or anxiety.  Being obese.  What are the signs or symptoms?  The main symptom of this condition is pulsating or throbbing pain. This pain may:  Happen in any area of the head, such as on one side or both sides.  Interfere with daily activities.  Get worse with physical activity.  Get worse with exposure to bright lights or loud noises.  Other symptoms may include:  Nausea.  Vomiting.  Dizziness.  General sensitivity to bright lights, loud noises, or  smells.  Before you get a migraine headache, you may get warning signs (an aura). An aura may include:  Seeing flashing lights or having blind spots.  Seeing bright spots, halos, or zigzag lines.  Having tunnel vision or blurred vision.  Having numbness or a tingling feeling.  Having trouble talking.  Having muscle weakness.  Some people have symptoms after a migraine headache (postdromal phase), such as:  Feeling tired.  Difficulty concentrating.  How is this diagnosed?  A migraine headache can be diagnosed based on:  Your symptoms.  A physical exam.  Tests, such as:  CT scan or an MRI of the head. These imaging tests can help rule out other causes of headaches.  Taking fluid from the spine (lumbar puncture) and analyzing it (cerebrospinal fluid analysis, or CSF analysis).  How is this treated?  This condition may be treated with medicines that:  Relieve pain.  Relieve nausea.  Prevent migraine headaches.  Treatment for this condition may also include:  Acupuncture.  Lifestyle changes like avoiding foods that trigger migraine headaches.  Biofeedback.  Cognitive behavioral therapy.  Follow these instructions at home:  Medicines  Take over-the-counter and prescription medicines only as told by your health care provider.  Ask your health care provider if the medicine prescribed to you:  Requires you to avoid driving or using heavy machinery.  Can cause constipation. You may need to take these actions to prevent or treat constipation:  Drink enough fluid to keep your urine pale yellow.  Take over-the-counter or prescription medicines.  Eat foods that are high in fiber, such as beans, whole grains, and fresh fruits and vegetables.  Limit foods that are high in fat and processed sugars, such as fried or sweet foods.  Lifestyle  Do not drink alcohol.  Do not use any products that contain nicotine or tobacco, such as cigarettes, e-cigarettes, and chewing tobacco. If you need help quitting, ask your health care  provider.  Get at least 8 hours of sleep every night.  Find ways to manage stress, such as meditation, deep breathing, or yoga.  General instructions         Keep a journal to find out what may trigger your migraine headaches. For example, write down:  What you eat and drink.  How much sleep you get.  Any change to your diet or medicines.  If you have a migraine headache:  Avoid things that make your symptoms worse, such as bright lights.  It may help to lie down in a dark, quiet room.  Do not drive or use heavy machinery.  Ask your health care provider what activities are safe for you while you are experiencing symptoms.  Keep all follow-up visits as told by your health care provider. This is important.  Contact a health care provider if:  You develop symptoms that are different or more severe than your usual migraine headache symptoms.  You have more than 15 headache days in one month.  Get help right away if:  Your migraine headache becomes severe.  Your migraine headache lasts longer than 72 hours.  You have a fever.  You have a stiff neck.  You have vision loss.  Your muscles feel weak or like you cannot control them.  You start to lose your balance often.  You have trouble walking.  You faint.  You have a seizure.  Summary  A migraine headache is an intense, throbbing pain on one side or both sides of the head. Migraines may also cause other symptoms, such as nausea, vomiting, and sensitivity to light and noise.  This condition may be treated with medicines and lifestyle changes. You may also need to avoid certain things that trigger a migraine headache.  Keep a journal to find out what may trigger your migraine headaches.  Contact your health care provider if you have more than 15 headache days in a month or you develop symptoms that are different or more severe than your usual migraine headache symptoms.  This information is not intended to replace advice given to you by your health care provider. Make sure  you discuss any questions you have with your health care provider.  Document Revised: 04/10/2020 Document Reviewed: 01/30/2020  Elsevier Patient Education © 2023 [employer]

## 2023-11-14 ENCOUNTER — APPOINTMENT (OUTPATIENT)
Dept: GENERAL RADIOLOGY | Facility: HOSPITAL | Age: 28
End: 2023-11-14
Payer: COMMERCIAL

## 2023-11-14 ENCOUNTER — HOSPITAL ENCOUNTER (EMERGENCY)
Facility: HOSPITAL | Age: 28
Discharge: HOME | End: 2023-11-14
Attending: EMERGENCY MEDICINE | Admitting: EMERGENCY MEDICINE
Payer: COMMERCIAL

## 2023-11-14 VITALS
TEMPERATURE: 97.9 F | HEIGHT: 65 IN | HEART RATE: 88 BPM | DIASTOLIC BLOOD PRESSURE: 74 MMHG | RESPIRATION RATE: 18 BRPM | BODY MASS INDEX: 48.82 KG/M2 | SYSTOLIC BLOOD PRESSURE: 121 MMHG | OXYGEN SATURATION: 98 % | WEIGHT: 293 LBS

## 2023-11-14 DIAGNOSIS — J21.0 RSV (ACUTE BRONCHIOLITIS DUE TO RESPIRATORY SYNCYTIAL VIRUS): ICD-10-CM

## 2023-11-14 DIAGNOSIS — J02.0 STREPTOCOCCAL PHARYNGITIS: Primary | ICD-10-CM

## 2023-11-14 LAB
FLUAV SUBTYP SPEC NAA+PROBE: NOT DETECTED
FLUBV RNA ISLT QL NAA+PROBE: NOT DETECTED
RSV RNA NPH QL NAA+NON-PROBE: DETECTED
S PYO AG THROAT QL: POSITIVE
SARS-COV-2 RNA RESP QL NAA+PROBE: NOT DETECTED

## 2023-11-14 PROCEDURE — 99283 EMERGENCY DEPT VISIT LOW MDM: CPT

## 2023-11-14 PROCEDURE — 87880 STREP A ASSAY W/OPTIC: CPT | Performed by: EMERGENCY MEDICINE

## 2023-11-14 PROCEDURE — 71046 X-RAY EXAM CHEST 2 VIEWS: CPT

## 2023-11-14 PROCEDURE — 87637 SARSCOV2&INF A&B&RSV AMP PRB: CPT | Performed by: EMERGENCY MEDICINE

## 2023-11-14 RX ORDER — ACETAMINOPHEN 325 MG/1
975 TABLET ORAL ONCE
Status: COMPLETED | OUTPATIENT
Start: 2023-11-14 | End: 2023-11-14

## 2023-11-14 RX ORDER — CEPHALEXIN 500 MG/1
500 CAPSULE ORAL 2 TIMES DAILY
Qty: 20 CAPSULE | Refills: 0 | Status: SHIPPED | OUTPATIENT
Start: 2023-11-14 | End: 2023-11-24

## 2023-11-14 RX ADMIN — ACETAMINOPHEN 975 MG: 325 TABLET ORAL at 11:07

## 2023-11-14 NOTE — Clinical Note
[facility] EMERGENCY ROOM  [address]  [place] [address]  Phone: [phone]    Zahida Meng was seen and treated in our emergency department on 11/14/2023.  She may return to work on 11/15/2023.         Thank you for choosing [facility].    Sean Jones PA-C

## 2023-11-14 NOTE — ED PROVIDER NOTES
Time: 10:32 AM EST  Date of encounter:  2023  Independent Historian/Clinical History and Information was obtained by:   Patient    History is limited by: N/A    Chief Complaint: Sore throat/congestion      History of Present Illness:  Patient is a 28 y.o. year old female who presents to the emergency department for evaluation of sore throat/congestion that began 1 week ago.  Patient denies fever, chest pain, shortness of air.  Patient states she has had a productive cough with green sputum present.  Patient states she has had chills and body aches.    HPI    Patient Care Team  Primary Care Provider: Mari Contreras APRN    Past Medical History:     Allergies   Allergen Reactions    Amoxicillin Hives    Doxycycline Rash     Past Medical History:   Diagnosis Date    Anxiety     Bronchitis     chronic    Chronic bronchitis     CTS (carpal tunnel syndrome)     Depression     Difficulty walking     Head injury Years ago    Headache, tension-type     HL (hearing loss)     Memory loss Short term memory loss    Migraine     Movement disorder     Multiple personality     Peripheral neuropathy     Seizures Months old    Vision loss      Past Surgical History:   Procedure Laterality Date     SECTION      GALLBLADDER SURGERY      UMBILICAL HERNIA REPAIR       Family History   Problem Relation Age of Onset    Neuropathy Mother     Migraines Father     Stroke Maternal Grandmother        Home Medications:  Prior to Admission medications    Medication Sig Start Date End Date Taking? Authorizing Provider   ARIPiprazole (ABILIFY) 5 MG tablet Take 1 tablet by mouth every night at bedtime. 22   Surjit Garsia MD   cetirizine (zyrTEC) 10 MG tablet Take 1 tablet by mouth Daily. 22   Surjit Garsia MD   cyclobenzaprine (FLEXERIL) 10 MG tablet Take 1 tablet by mouth 3 (Three) Times a Day As Needed. 22   Surjit Garsia MD   hydrocortisone (ANUSOL-HC) 25 MG suppository Insert 1  suppository into the rectum 2 (Two) Times a Day.  Patient not taking: Reported on 7/24/2023 7/7/23   Linda Barboza APRN   hydrOXYzine (ATARAX) 25 MG tablet Take 1 tablet by mouth. 4/18/23   Surjit Garsia MD   lamoTRIgine (LaMICtal) 200 MG tablet Take 1 tablet by mouth Daily for 30 days. 7/24/23 8/23/23  Mecca English APRN   metFORMIN (GLUCOPHAGE) 500 MG tablet Take As Directed. 11/28/22   Surjit Garsia MD   methylphenidate (RITALIN) 5 MG tablet Take 1 tablet by mouth 2 (Two) Times a Day.    Surjit Garsia MD   ondansetron ODT (ZOFRAN-ODT) 4 MG disintegrating tablet Place 1 tablet on the tongue Every 8 (Eight) Hours As Needed for Nausea or Vomiting. 5/16/23   Blaine Nichols MD   propranolol (INDERAL) 20 MG tablet Take 1 tablet by mouth 2 (Two) Times a Day. 1 tablet BID 7/26/23   Mecca English APRN   rOPINIRole (REQUIP) 4 MG tablet Take 1 tablet by mouth every night at bedtime. 11/30/22   Surjit Garsia MD   SUMAtriptan (IMITREX) 50 MG tablet Take 1 tablet by mouth Every 2 (Two) Hours As Needed for Migraine. Take one tablet at onset of headache. May repeat dose one time in 2 hours if headache not relieved. 7/26/23   Mecca English APRN   traZODone (DESYREL) 100 MG tablet Take 50 mg by mouth Every Night. 2/22/23   Surjit Garsia MD   venlafaxine XR (EFFEXOR-XR) 150 MG 24 hr capsule Take 1 capsule by mouth Daily. 11/23/22   Surjit Garsia MD        Social History:   Social History     Tobacco Use    Smoking status: Former     Packs/day: 0.25     Years: 8.00     Additional pack years: 0.00     Total pack years: 2.00     Types: Cigarettes, Electronic Cigarette    Smokeless tobacco: Never   Vaping Use    Vaping Use: Every day    Substances: Nicotine, Flavoring    Devices: Disposable   Substance Use Topics    Alcohol use: Not Currently    Drug use: Yes     Frequency: 7.0 times per week     Types: Marijuana         Review of Systems:  Review of Systems  "  Constitutional:  Positive for chills. Negative for fever.   HENT:  Positive for congestion and sore throat. Negative for rhinorrhea.    Eyes:  Negative for pain and visual disturbance.   Respiratory:  Positive for cough. Negative for apnea, chest tightness and shortness of breath.    Cardiovascular:  Negative for chest pain and palpitations.   Gastrointestinal:  Negative for abdominal pain, diarrhea, nausea and vomiting.   Genitourinary:  Negative for difficulty urinating and dysuria.   Musculoskeletal:  Positive for myalgias. Negative for joint swelling.   Skin:  Negative for color change.   Neurological:  Negative for seizures and headaches.   Psychiatric/Behavioral: Negative.     All other systems reviewed and are negative.       Physical Exam:  /74 (BP Location: Left arm, Patient Position: Sitting)   Pulse 88   Temp 97.9 °F (36.6 °C) (Oral)   Resp 18   Ht 165.1 cm (65\")   Wt (!) 162 kg (357 lb 2.3 oz)   SpO2 98%   BMI 59.43 kg/m²     Physical Exam  Vitals and nursing note reviewed.   Constitutional:       General: She is not in acute distress.     Appearance: Normal appearance. She is not toxic-appearing.   HENT:      Head: Normocephalic and atraumatic.      Jaw: There is normal jaw occlusion.      Right Ear: Ear canal normal.      Left Ear: Ear canal normal.      Mouth/Throat:      Pharynx: Posterior oropharyngeal erythema present.      Tonsils: Tonsillar exudate present.   Eyes:      General: Lids are normal.      Extraocular Movements: Extraocular movements intact.      Conjunctiva/sclera: Conjunctivae normal.      Pupils: Pupils are equal, round, and reactive to light.   Cardiovascular:      Rate and Rhythm: Normal rate and regular rhythm.      Pulses: Normal pulses.      Heart sounds: Normal heart sounds.   Pulmonary:      Effort: Pulmonary effort is normal. No respiratory distress.      Breath sounds: Normal breath sounds. No wheezing or rhonchi.   Abdominal:      General: Abdomen is flat. "      Palpations: Abdomen is soft.      Tenderness: There is no abdominal tenderness. There is no guarding or rebound.   Musculoskeletal:         General: Normal range of motion.      Cervical back: Normal range of motion and neck supple.      Right lower leg: No edema.      Left lower leg: No edema.   Skin:     General: Skin is warm and dry.   Neurological:      Mental Status: She is alert and oriented to person, place, and time. Mental status is at baseline.   Psychiatric:         Mood and Affect: Mood normal.                  Procedures:  Procedures      Medical Decision Making:      Comorbidities that affect care:    Bronchitis, migraines    External Notes reviewed:          The following orders were placed and all results were independently analyzed by me:  Orders Placed This Encounter   Procedures    COVID-19, FLU A/B, RSV PCR 1 HR TAT - Swab, Nasopharynx    Rapid Strep A Screen - Swab, Throat    XR Chest 2 View       Medications Given in the Emergency Department:  Medications   acetaminophen (TYLENOL) tablet 975 mg (has no administration in time range)        ED Course:         Labs:    Lab Results (last 24 hours)       Procedure Component Value Units Date/Time    COVID-19, FLU A/B, RSV PCR 1 HR TAT - Swab, Nasopharynx [[id number]]  (Abnormal) Collected: 11/14/23 0913    Specimen: Swab from Nasopharynx Updated: 11/14/23 1008     COVID19 Not Detected     Influenza A PCR Not Detected     Influenza B PCR Not Detected     RSV, PCR Detected    Narrative:      Fact sheet for providers: https://www.fda.gov/media/619330/download    Fact sheet for patients: https://www.fda.gov/media/959652/download    Test performed by PCR.    Rapid Strep A Screen - Swab, Throat [[id number]]  (Abnormal) Collected: 11/14/23 0913    Specimen: Swab from Throat Updated: 11/14/23 0947     Strep A Ag Positive             Imaging:    XR Chest 2 View    Result Date: 11/14/2023  PROCEDURE: XR CHEST 2 VW  [facility] 13:39.  INDICATIONS: PRODUCTIVE COUGH TODAY  FINDINGS:  Lungs are adequately expanded and appear clear.  No consolidation or pleural effusion is seen.  Cardiomediastinal contours appear stable.  There is leftward curvature of the upper thoracic spine.        No acute cardiopulmonary abnormality is identified.     EDUARDO PALACIOS MD       Electronically Signed and Approved By: EDUARDO PALACIOS MD on 11/14/2023 at 10:20                Differential Diagnosis and Discussion:    Cough: Differential diagnosis includes but is not limited to pneumonia, acute bronchitis, upper respiratory infection, ACE inhibitor use, allergic reaction, epiglottitis, seasonal allergies, chemical irritants, exercise-induced asthma, viral syndrome.  Sore Throat: Differential diagnosis includes but is not limited to bacterial infection, viral infection, inhaled irritants, sinus drainage, thyroiditis, epiglottitis, and retropharyngeal abscess.    All labs were reviewed and interpreted by me.  All X-rays impressions were independently interpreted by me.    MDM     Patient tested positive for strep and RSV.  Patient's chest x-ray was unremarkable.  Patient is afebrile in the ED at this time.  Patient's oxygen saturation is 98% on room air.  Patient states she can take cephalosporins so I will start her on Keflex.  Instructed patient return to the ED she also worsening symptoms.  Patient states she understands agrees plan of care.          Patient Care Considerations:          Consultants/Shared Management Plan:    None    Social Determinants of Health:    Patient is independent, reliable, and has access to care.       Disposition and Care Coordination:    Discharged: I considered escalation of care by admitting this patient to the hospital, however the patient has improved and is suitable and stable for discharge.    I have explained the patient´s condition, diagnoses and treatment plan based on the information  available to me at this time. I have answered questions and addressed any concerns. The patient has a good  understanding of the patient´s diagnosis, condition, and treatment plan as can be expected at this point. The vital signs have been stable. The patient´s condition is stable and appropriate for discharge from the emergency department.      The patient will pursue further outpatient evaluation with the primary care physician or other designated or consulting physician as outlined in the discharge instructions. They are agreeable to this plan of care and follow-up instructions have been explained in detail. The patient has received these instructions in written format and have expressed an understanding of the discharge instructions. The patient is aware that any significant change in condition or worsening of symptoms should prompt an immediate return to this or the closest emergency department or call to 911.  I have explained discharge medications and the need for follow up with the patient/caretakers. This was also printed in the discharge instructions. Patient was discharged with the following medications and follow up:      Medication List        New Prescriptions      cephalexin 500 MG capsule  Commonly known as: KEFLEX  Take 1 capsule by mouth 2 (Two) Times a Day for 10 days.               Where to Get Your Medications        These medications were sent to [facility]/pharmacy #[id number] - [place], [place] - [address] - [phone]  - [phone] FX  [address] [place] [address]      Hours: 24-hours Phone: [phone]   cephalexin 500 MG capsule      Mari Contreras, APRN  [address] [address] [phone]    Call in 2 days  As needed       Final diagnoses:   Streptococcal pharyngitis   RSV (acute bronchiolitis due to respiratory syncytial virus)        ED Disposition       ED Disposition   Discharge    Condition   Stable    Comment   --               This medical  record created using voice recognition software.             Sean Jones PA-C  11/14/23 1038

## 2023-11-27 ENCOUNTER — HOSPITAL ENCOUNTER (EMERGENCY)
Facility: HOSPITAL | Age: 28
Discharge: HOME | End: 2023-11-27
Attending: EMERGENCY MEDICINE | Admitting: EMERGENCY MEDICINE
Payer: OTHER MISCELLANEOUS

## 2023-11-27 ENCOUNTER — APPOINTMENT (OUTPATIENT)
Dept: GENERAL RADIOLOGY | Facility: HOSPITAL | Age: 28
End: 2023-11-27
Payer: OTHER MISCELLANEOUS

## 2023-11-27 VITALS
HEIGHT: 65 IN | HEART RATE: 94 BPM | BODY MASS INDEX: 48.82 KG/M2 | RESPIRATION RATE: 16 BRPM | OXYGEN SATURATION: 99 % | SYSTOLIC BLOOD PRESSURE: 143 MMHG | WEIGHT: 293 LBS | TEMPERATURE: 99.2 F | DIASTOLIC BLOOD PRESSURE: 84 MMHG
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
## 2023-11-27 PROCEDURE — 73130 X-RAY EXAM OF HAND: CPT

## 2023-11-27 PROCEDURE — 99283 EMERGENCY DEPT VISIT LOW MDM: CPT

## 2023-11-27 RX ORDER — DIFLUNISAL 500 MG/1
500 TABLET, FILM COATED ORAL 2 TIMES DAILY
Qty: 30 TABLET | Refills: 0 | Status: SHIPPED | OUTPATIENT
Start: 2023-11-27

## 2023-11-27 RX ORDER — HYDROCODONE BITARTRATE AND ACETAMINOPHEN 5; 325 MG/1; MG/1
1 TABLET ORAL ONCE AS NEEDED
Status: COMPLETED | OUTPATIENT
Start: 2023-11-27 | End: 2023-11-27

## 2023-11-27 RX ORDER — MOXIFLOXACIN HYDROCHLORIDE 400 MG/1
400 TABLET ORAL DAILY
Qty: 7 TABLET | Refills: 0 | Status: SHIPPED | OUTPATIENT
Start: 2023-11-27 | End: 2023-12-04

## 2023-11-27 RX ADMIN — HYDROCODONE BITARTRATE AND ACETAMINOPHEN 1 TABLET: 5; 325 TABLET ORAL at 22:45

## 2023-11-28 NOTE — DISCHARGE INSTRUCTIONS
Keep the area clean and dry.  Take your meds as prescribed.  Complete the antibiotics.  You may take over-the-counter acetaminophen with your medications as needed for pain.  Ice to area with gentle range of motion stretches several times a day.  Call work well in the morning and follow-up with them as directed for further evaluation and treatment.  Return to the emergency department for any acutely developing redness, any drainage, any fevers of 101 or greater or any new or worse concerns.

## 2023-11-28 NOTE — ED PROVIDER NOTES
Time: 8:43 PM EST  Date of encounter:  2023  Independent Historian/Clinical History and Information was obtained by:   Patient    History is limited by: N/A    Chief Complaint   Patient presents with    Animal Bite         History of Present Illness:  The patient presents to the emergency department for evaluation of pain in the left hand/thumb after being bitten by dog today at work.  The patient states it was a [place] mal[place] puppy that was up-to-date on vaccinations.  The patient denies fever.  She is able to flex/extend all digits validated difficulties.  She states she is up-to-date with her immunizations.  She does complain of diffuse numbness in her entire hand and fingers but is able to use it without any difficulties.    Patient Care Team  Primary Care Provider: Mari Contreras APRN    Past Medical History:     Allergies   Allergen Reactions    Amoxicillin Hives    Augmentin [Amoxicillin-Pot Clavulanate] Hives    Doxycycline Rash     Past Medical History:   Diagnosis Date    Anxiety     Bronchitis     chronic    Chronic bronchitis     CTS (carpal tunnel syndrome)     Depression     Difficulty walking     Head injury Years ago    Headache, tension-type     HL (hearing loss)     Memory loss Short term memory loss    Migraine     Movement disorder     Multiple personality     Peripheral neuropathy     Seizures Months old    Vision loss      Past Surgical History:   Procedure Laterality Date     SECTION      GALLBLADDER SURGERY      UMBILICAL HERNIA REPAIR       Family History   Problem Relation Age of Onset    Neuropathy Mother     Migraines Father     Stroke Maternal Grandmother        Home Medications:  Prior to Admission medications    Medication Sig Start Date End Date Taking? Authorizing Provider   ARIPiprazole (ABILIFY) 5 MG tablet Take 1 tablet by mouth every night at bedtime. 22   Provider, MD Surjit   cetirizine (zyrTEC) 10 MG tablet Take 1 tablet by mouth Daily.  11/30/22   Surjit Garsia MD   cyclobenzaprine (FLEXERIL) 10 MG tablet Take 1 tablet by mouth 3 (Three) Times a Day As Needed. 11/30/22   Surjit Garsia MD   hydrocortisone (ANUSOL-HC) 25 MG suppository Insert 1 suppository into the rectum 2 (Two) Times a Day.  Patient not taking: Reported on 7/24/2023 7/7/23   Linda Barboza APRN   hydrOXYzine (ATARAX) 25 MG tablet Take 1 tablet by mouth. 4/18/23   Surjit Garsia MD   lamoTRIgine (LaMICtal) 200 MG tablet Take 1 tablet by mouth Daily for 30 days. 7/24/23 8/23/23  Mecca English APRN   metFORMIN (GLUCOPHAGE) 500 MG tablet Take As Directed. 11/28/22   Surjit Grasia MD   methylphenidate (RITALIN) 5 MG tablet Take 1 tablet by mouth 2 (Two) Times a Day.    Surjit Garsia MD   ondansetron ODT (ZOFRAN-ODT) 4 MG disintegrating tablet Place 1 tablet on the tongue Every 8 (Eight) Hours As Needed for Nausea or Vomiting. 5/16/23   Blaine Nichols MD   propranolol (INDERAL) 20 MG tablet Take 1 tablet by mouth 2 (Two) Times a Day. 1 tablet BID 7/26/23   Mecca English APRN   rOPINIRole (REQUIP) 4 MG tablet Take 1 tablet by mouth every night at bedtime. 11/30/22   Srujit Garsia MD   SUMAtriptan (IMITREX) 50 MG tablet Take 1 tablet by mouth Every 2 (Two) Hours As Needed for Migraine. Take one tablet at onset of headache. May repeat dose one time in 2 hours if headache not relieved. 7/26/23   Mecca English APRN   traZODone (DESYREL) 100 MG tablet Take 50 mg by mouth Every Night. 2/22/23   Surjit Garsia MD   venlafaxine XR (EFFEXOR-XR) 150 MG 24 hr capsule Take 1 capsule by mouth Daily. 11/23/22   Surjit Garsia MD        Social History:   Social History     Tobacco Use    Smoking status: Former     Packs/day: 0.25     Years: 8.00     Additional pack years: 0.00     Total pack years: 2.00     Types: Cigarettes, Electronic Cigarette    Smokeless tobacco: Never   Vaping Use    Vaping Use: Every day     "Substances: Nicotine, Flavoring    Devices: Disposable   Substance Use Topics    Alcohol use: Not Currently    Drug use: Yes     Frequency: 7.0 times per week     Types: Marijuana         Review of Systems:  Review of Systems   Constitutional:  Negative for chills and fever.   HENT:  Negative for congestion, ear pain and sore throat.    Eyes:  Negative for pain.   Respiratory:  Negative for cough, chest tightness and shortness of breath.    Cardiovascular:  Negative for chest pain.   Gastrointestinal:  Negative for abdominal pain, diarrhea, nausea and vomiting.   Genitourinary:  Negative for flank pain and hematuria.   Musculoskeletal:  Positive for arthralgias and myalgias. Negative for joint swelling.   Skin:  Positive for color change and wound. Negative for pallor.   Neurological:  Positive for numbness. Negative for seizures and headaches.   All other systems reviewed and are negative.       Physical Exam:  /84 (BP Location: Left arm, Patient Position: Lying)   Pulse 94   Temp 99.2 °F (37.3 °C) (Oral)   Resp 16   Ht 165.1 cm (65\")   Wt (!) 159 kg (349 lb 6.9 oz)   SpO2 99%   BMI 58.15 kg/m²         Physical Exam  Vitals and nursing note reviewed.   Constitutional:       General: She is not in acute distress.     Appearance: Normal appearance. She is not ill-appearing or toxic-appearing.   HENT:      Head: Normocephalic and atraumatic.   Eyes:      General: No scleral icterus.     Conjunctiva/sclera: Conjunctivae normal.      Pupils: Pupils are equal, round, and reactive to light.   Cardiovascular:      Rate and Rhythm: Normal rate and regular rhythm.      Pulses: Normal pulses.   Pulmonary:      Effort: Pulmonary effort is normal. No respiratory distress.   Musculoskeletal:         General: Swelling, tenderness and signs of injury present. No deformity. Normal range of motion.      Cervical back: Normal range of motion.      Right lower leg: No edema.      Left lower leg: No edema.   Skin:     " General: Skin is warm and dry.      Capillary Refill: Capillary refill takes less than 2 seconds.      Findings: Lesion present. No bruising, erythema or rash.      Comments: Small puncture present to left thumb   Neurological:      General: No focal deficit present.      Mental Status: She is alert and oriented to person, place, and time. Mental status is at baseline.   Psychiatric:         Mood and Affect: Mood normal.         Behavior: Behavior normal.                  Procedures:  Procedures      Medical Decision Making:      Comorbidities that affect care:    Bronchitis, anxiety, head injury, migraines, carpal tunnel syndrome, memory loss, vision loss, peripheral neuropathy, depression, multiple personalities, seizures, headache, hearing loss, movement disorder, difficulty walking, phonic bronchitis    External Notes reviewed:    None      The following orders were placed and all results were independently analyzed by me:  Orders Placed This Encounter   Procedures    XR Hand 3+ View Left       Medications Given in the Emergency Department:  Medications   HYDROcodone-acetaminophen (NORCO) 5-325 MG per tablet 1 tablet (1 tablet Oral Given 11/27/23 7105)        ED Course:    The patient was initially evaluated in the triage area where orders were placed. The patient was later dispositioned by ANTHONY Denny.      The patient was advised to stay for completion of workup which includes but is not limited to communication of labs and radiological results, reassessment and plan. The patient was advised that leaving prior to disposition by a provider could result in critical findings that are not communicated to the patient.     ED Course as of 11/28/23 0645   Mon Nov 27, 2023 2042 Provider In Triage: Patient was evaluated by me in triage, Sean Jones PA-C. Orders were placed and the patient is currently awaiting final results and disposition.   [SK]      ED Course User Index  [SK] Sean Jones PA-C        Labs:    Lab Results (last 24 hours)       ** No results found for the last 24 hours. **             Imaging:    XR Hand 3+ View Left    Result Date: 11/27/2023  PROCEDURE: XR HAND 3+ VW LEFT  COMPARISON: None.  INDICATIONS: dog bite to left thumb and hand  FINDINGS:  BONES: No significant arthropathy or acute abnormality.  SOFT TISSUES: No visible soft tissue swelling.  No subcutaneous emphysema.  No retained radiopaque foreign body. EFFUSION: None visible.  OTHER: External artifacts are noted.  If symptoms or clinical concerns persist, consider imaging follow-up.        No acute fracture or acute malalignment.   Please note that portions of this note were completed with a voice recognition program.  ADELSO COX JR, MD       Electronically Signed and Approved By: ADELSO COX JR, MD on 11/27/2023 at 22:09                 Differential Diagnosis and Discussion:      Extremity Pain: Differential diagnosis includes but is not limited to soft tissue sprain, tendonitis, tendon injury, dislocation, fracture, deep vein thrombosis, arterial insufficiency, osteoarthritis, bursitis, and ligamentous damage.  Joint Pain: Differential diagnosis includes but is not limited to polyarticular arthritis, gout, tendinitis, hemarthrosis, septic arthritis, rheumatoid arthritis, bursitis, degenerative joint disease, joint effusion, autoimmune disorder, trauma, and occult neoplasm.    All X-rays impressions were independently interpreted by me.    MDM  Number of Diagnoses or Management Options  Dog bite, initial encounter: new and requires workup     Amount and/or Complexity of Data Reviewed  Tests in the radiology section of CPT®: reviewed    Risk of Complications, Morbidity, and/or Mortality  Presenting problems: low  Diagnostic procedures: low  Management options: low    Patient Progress  Patient progress: stable         Patient Care Considerations:    NARCOTICS: I considered prescribing opiate pain medication as an  outpatient, however patient did not require any narcotic pain medications.      Consultants/Shared Management Plan:    None    Social Determinants of Health:    Patient is independent, reliable, and has access to care.       Disposition and Care Coordination:    Discharged: The patient is suitable and stable for discharge with no need for consideration of observation or admission.    I have explained the patient´s condition, diagnoses and treatment plan based on the information available to me at this time. I have answered questions and addressed any concerns. The patient has a good  understanding of the patient´s diagnosis, condition, and treatment plan as can be expected at this point. The vital signs have been stable. The patient´s condition is stable and appropriate for discharge from the emergency department.      The patient will pursue further outpatient evaluation with the primary care physician or other designated or consulting physician as outlined in the discharge instructions. They are agreeable to this plan of care and follow-up instructions have been explained in detail. The patient has received these instructions in written format and have expressed an understanding of the discharge instructions. The patient is aware that any significant change in condition or worsening of symptoms should prompt an immediate return to this or the closest emergency department or call to 911.  I have explained discharge medications and the need for follow up with the patient/caretakers. This was also printed in the discharge instructions. Patient was discharged with the following medications and follow up:      Medication List        New Prescriptions      diflunisal 500 MG tablet tablet  Commonly known as: DOLOBID  Take 1 tablet by mouth 2 (Two) Times a Day.     moxifloxacin 400 MG tablet  Commonly known as: AVELOX  Take 1 tablet by mouth Daily for 7 days.               Where to Get Your Medications        These  medications were sent to [facility]/pharmacy #[id number] - [place], [place] - [address] - [phone]  - [phone] FX  [address] [place] [address]      Hours: 24-hours Phone: [phone]   diflunisal 500 MG tablet tablet  moxifloxacin 400 MG tablet      [facility]  [address]  [facility] [id number] [phone]  Call   FOR FOLLOW UP    Mari Contreras, APRN  [address] [facility] [address] [phone]    Call   As needed       Final diagnoses:   Dog bite, initial encounter        ED Disposition       ED Disposition   Discharge    Condition   Stable    Comment   --               This medical record created using voice recognition software.             Jayshree Suarez, APRN  11/28/23 0645

## 2023-11-28 NOTE — ED TRIAGE NOTES
Pt comes to the ER tonight for a dog bite to the left thumb. Pt states that the tip of her thumb is numb. Pt states she feels like her hand is bruised as well from where the dog bit her.

## 2024-02-21 ENCOUNTER — HOSPITAL ENCOUNTER (EMERGENCY)
Facility: HOSPITAL | Age: 29
Discharge: HOME | End: 2024-02-21
Attending: EMERGENCY MEDICINE | Admitting: EMERGENCY MEDICINE
Payer: OTHER MISCELLANEOUS

## 2024-02-21 ENCOUNTER — APPOINTMENT (OUTPATIENT)
Dept: GENERAL RADIOLOGY | Facility: HOSPITAL | Age: 29
End: 2024-02-21
Payer: OTHER MISCELLANEOUS

## 2024-02-21 VITALS
TEMPERATURE: 98.6 F | RESPIRATION RATE: 18 BRPM | BODY MASS INDEX: 48.82 KG/M2 | DIASTOLIC BLOOD PRESSURE: 91 MMHG | OXYGEN SATURATION: 100 % | SYSTOLIC BLOOD PRESSURE: 132 MMHG | HEIGHT: 65 IN | WEIGHT: 293 LBS | HEART RATE: 107 BPM

## 2024-02-21 DIAGNOSIS — M25.512 ACUTE PAIN OF LEFT SHOULDER: Primary | ICD-10-CM

## 2024-02-21 DIAGNOSIS — M25.812 IMPINGEMENT OF LEFT SHOULDER: ICD-10-CM

## 2024-02-21 PROCEDURE — 73030 X-RAY EXAM OF SHOULDER: CPT

## 2024-02-21 PROCEDURE — 99283 EMERGENCY DEPT VISIT LOW MDM: CPT

## 2024-02-21 NOTE — ED PROVIDER NOTES
Time: 1:17 AM EST  Date of encounter:  2024  Independent Historian/Clinical History and Information was obtained by:   Patient    History is limited by: N/A    Chief Complaint: Left shoulder pain        History of Present Illness:  Patient is a 28 y.o. year old female who presents to the emergency department for evaluation of left shoulder pain that began at work tonight when a dog pulled her arm with the leash off the table.  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  Patient states she felt a pop in the shoulder.  Patient states she is able to move the shoulder at this time but has pain in doing so.  Patient denies any prior surgical history to the shoulder.  Patient denies numbness/tingling in fingers.    HPI    Patient Care Team  Primary Care Provider: Mari Contreras APRN    Past Medical History:     Allergies   Allergen Reactions    Amoxicillin Hives    Augmentin [Amoxicillin-Pot Clavulanate] Hives    Doxycycline Rash     Past Medical History:   Diagnosis Date    Anxiety     Bronchitis     chronic    Chronic bronchitis     CTS (carpal tunnel syndrome)     Depression     Difficulty walking     Head injury Years ago    Headache, tension-type     HL (hearing loss)     Memory loss Short term memory loss    Migraine     Movement disorder     Multiple personality     Peripheral neuropathy     Seizures Months old    Vision loss      Past Surgical History:   Procedure Laterality Date     SECTION      GALLBLADDER SURGERY      UMBILICAL HERNIA REPAIR       Family History   Problem Relation Age of Onset    Neuropathy Mother     Migraines Father     Stroke Maternal Grandmother        Home Medications:  Prior to Admission medications    Medication Sig Start Date End Date Taking? Authorizing Provider   ARIPiprazole (ABILIFY) 10 MG tablet Take 1 tablet by mouth every night at bedtime. 11/15/23   Provider, MD Surjit   cetirizine (zyrTEC) 10 MG tablet Take 1 tablet by mouth Daily. 22    Surjit Garsia MD   cyclobenzaprine (FLEXERIL) 10 MG tablet Take 1 tablet by mouth 3 (Three) Times a Day As Needed. 11/30/22   Surjit Garsia MD   lamoTRIgine (LaMICtal) 200 MG tablet Take 1 tablet by mouth Daily for 30 days. 7/24/23 2/2/24  Mecca English APRN   methylphenidate (RITALIN) 10 MG tablet Take 1 tablet by mouth Every 12 (Twelve) Hours. 11/15/23   Surjit Garsia MD   naproxen (NAPROSYN) 500 MG tablet Take 1 tablet by mouth 2 (Two) Times a Day As Needed for Mild Pain or Moderate Pain. 1/26/24   Kayleen Monique PA-C   propranolol (INDERAL) 20 MG tablet Take 1 tablet by mouth 2 (Two) Times a Day. 1 tablet BID 7/26/23   Mecca English APRN   rOPINIRole (REQUIP) 4 MG tablet Take 1 tablet by mouth every night at bedtime. 11/30/22   Surjit Garsia MD   SUMAtriptan (IMITREX) 50 MG tablet Take 1 tablet by mouth Every 2 (Two) Hours As Needed for Migraine. Take one tablet at onset of headache. May repeat dose one time in 2 hours if headache not relieved. 7/26/23   Mecca English APRN   venlafaxine XR (EFFEXOR-XR) 150 MG 24 hr capsule Take 1 capsule by mouth Daily. 11/23/22   Surjit Garsia MD        Social History:   Social History     Tobacco Use    Smoking status: Former     Packs/day: 0.25     Years: 8.00     Additional pack years: 0.00     Total pack years: 2.00     Types: Cigarettes, Electronic Cigarette    Smokeless tobacco: Never   Vaping Use    Vaping Use: Every day    Substances: Nicotine, Flavoring    Devices: Disposable   Substance Use Topics    Alcohol use: Not Currently    Drug use: Yes     Frequency: 7.0 times per week     Types: Marijuana         Review of Systems:  Review of Systems   Constitutional:  Negative for chills and fever.   HENT:  Negative for congestion, rhinorrhea and sore throat.    Eyes:  Negative for pain and visual disturbance.   Respiratory:  Negative for apnea, cough, chest tightness and shortness of breath.    Cardiovascular:  Negative  "for chest pain and palpitations.   Gastrointestinal:  Negative for abdominal pain, diarrhea, nausea and vomiting.   Genitourinary:  Negative for difficulty urinating and dysuria.   Musculoskeletal:  Positive for arthralgias. Negative for joint swelling and myalgias.   Skin:  Negative for color change.   Neurological:  Negative for seizures and headaches.   Psychiatric/Behavioral: Negative.     All other systems reviewed and are negative.       Physical Exam:  /91 (BP Location: Right arm, Patient Position: Sitting)   Pulse 107   Temp 98.6 °F (37 °C) (Oral)   Resp 18   Ht 165.1 cm (65\")   Wt (!) 158 kg (348 lb 1.7 oz)   LMP 01/23/2024 (Exact Date)   SpO2 100%   BMI 57.93 kg/m²     Physical Exam  Vitals and nursing note reviewed.   Constitutional:       General: She is not in acute distress.     Appearance: Normal appearance. She is not toxic-appearing.   HENT:      Head: Normocephalic and atraumatic.      Jaw: There is normal jaw occlusion.   Eyes:      General: Lids are normal.      Extraocular Movements: Extraocular movements intact.      Conjunctiva/sclera: Conjunctivae normal.      Pupils: Pupils are equal, round, and reactive to light.   Cardiovascular:      Rate and Rhythm: Normal rate and regular rhythm.      Pulses: Normal pulses.      Heart sounds: Normal heart sounds.   Pulmonary:      Effort: Pulmonary effort is normal. No respiratory distress.      Breath sounds: Normal breath sounds. No wheezing or rhonchi.   Abdominal:      General: Abdomen is flat.      Palpations: Abdomen is soft.      Tenderness: There is no abdominal tenderness. There is no guarding or rebound.   Musculoskeletal:      Cervical back: Normal range of motion and neck supple.      Right lower leg: No edema.      Left lower leg: No edema.      Comments: Left shoulder range of motion decreased secondary to pain.   Skin:     General: Skin is warm and dry.   Neurological:      Mental Status: She is alert and oriented to person, " place, and time. Mental status is at baseline.   Psychiatric:         Mood and Affect: Mood normal.                Procedures:  Procedures      Medical Decision Making:      Comorbidities that affect care:    None    External Notes reviewed:          The following orders were placed and all results were independently analyzed by me:  Orders Placed This Encounter   Procedures    XR Shoulder 2+ View Left    Ambulatory Referral to Orthopedic Surgery       Medications Given in the Emergency Department:  Medications - No data to display     ED Course:         Labs:    Lab Results (last 24 hours)       ** No results found for the last 24 hours. **             Imaging:    XR Shoulder 2+ View Left    Result Date: 2/21/2024  PROCEDURE: XR SHOULDER 2+ VW LEFT  COMPARISON: [facility], CR, XR SHOULDER 2+ VW LEFT, 2/02/2024, 13:22.  INDICATIONS: LEFT SHOULDER PAIN  FINDINGS:  No fracture or dislocation.  No AC joint separation.  No bone erosion or destruction.       Negative left shoulder.      CECIL ROMERO MD       Electronically Signed and Approved By: CECIL ROMERO MD on 2/21/2024 at 1:00                Differential Diagnosis and Discussion:    Extremity Pain: Differential diagnosis includes but is not limited to soft tissue sprain, tendonitis, tendon injury, dislocation, fracture, deep vein thrombosis, arterial insufficiency, osteoarthritis, bursitis, and ligamentous damage.    All X-rays impressions were independently interpreted by me.    MDM     X-ray left shoulder shows no acute osseous abnormality.  I will provide an ambulatory referral to orthopedic surgery for further evaluation/imaging if needed.  Instructed patient to return to ED in the meantime if she develops any worsening symptoms.  Patient states she understands and agrees with plan of care.          Patient Care Considerations:          Consultants/Shared Management Plan:    None    Social Determinants of Health:    Patient is independent, reliable, and has  access to care.       Disposition and Care Coordination:    Discharged: The patient is suitable and stable for discharge with no need for consideration of admission.    I have explained the patient´s condition, diagnoses and treatment plan based on the information available to me at this time. I have answered questions and addressed any concerns. The patient has a good  understanding of the patient´s diagnosis, condition, and treatment plan as can be expected at this point. The vital signs have been stable. The patient´s condition is stable and appropriate for discharge from the emergency department.      The patient will pursue further outpatient evaluation with the primary care physician or other designated or consulting physician as outlined in the discharge instructions. They are agreeable to this plan of care and follow-up instructions have been explained in detail. The patient has received these instructions in written format and has expressed an understanding of the discharge instructions. The patient is aware that any significant change in condition or worsening of symptoms should prompt an immediate return to this or the closest emergency department or call to 911.  I have explained discharge medications and the need for follow up with the patient/caretakers. This was also printed in the discharge instructions. Patient was discharged with the following medications and follow up:      Medication List      No changes were made to your prescriptions during this visit.      Tiffany Lindo MD  [address]  [place] [address]  [phone]    Schedule an appointment as soon as possible for a visit in 1 week  Follow-up       Final diagnoses:   Acute pain of left shoulder   Impingement of left shoulder        ED Disposition       ED Disposition   Discharge    Condition   Stable    Comment   --               This medical record created using voice recognition software.             Sean Jones,  EDGARD  02/21/24 0120

## 2024-02-21 NOTE — Clinical Note
[facility] EMERGENCY ROOM  [address]  [place] [address]  Phone: [phone]    Zahida Meng was seen and treated in our emergency department on 2/21/2024.  She may return to work on 02/22/2024.         Thank you for choosing [facility].    Sean Jones PA-C

## 2024-03-26 ENCOUNTER — OFFICE VISIT (OUTPATIENT)
Dept: NEUROLOGY | Facility: CLINIC | Age: 29
End: 2024-03-26
Payer: COMMERCIAL

## 2024-03-26 VITALS
HEIGHT: 65 IN | BODY MASS INDEX: 48.82 KG/M2 | SYSTOLIC BLOOD PRESSURE: 125 MMHG | DIASTOLIC BLOOD PRESSURE: 87 MMHG | WEIGHT: 293 LBS | HEART RATE: 97 BPM

## 2024-03-26 DIAGNOSIS — G40.109 PARTIAL EPILEPSY ORIGINATING IN TEMPORAL LOBE: ICD-10-CM

## 2024-03-26 DIAGNOSIS — G43.009 MIGRAINE WITHOUT AURA AND WITHOUT STATUS MIGRAINOSUS, NOT INTRACTABLE: Primary | ICD-10-CM

## 2024-03-26 RX ORDER — DOXEPIN HYDROCHLORIDE 25 MG/1
CAPSULE ORAL
COMMUNITY
Start: 2024-02-16

## 2024-03-26 RX ORDER — PROPRANOLOL HYDROCHLORIDE 20 MG/1
20 TABLET ORAL 2 TIMES DAILY
Qty: 180 TABLET | Refills: 1 | Status: SHIPPED | OUTPATIENT
Start: 2024-03-26

## 2024-03-26 RX ORDER — SUMATRIPTAN 50 MG/1
50 TABLET, FILM COATED ORAL
Qty: 9 TABLET | Refills: 5 | Status: SHIPPED | OUTPATIENT
Start: 2024-03-26

## 2024-03-26 RX ORDER — LACOSAMIDE 100 MG/1
100 TABLET ORAL EVERY 12 HOURS SCHEDULED
Qty: 60 TABLET | Refills: 5 | Status: SHIPPED | OUTPATIENT
Start: 2024-03-26

## 2024-03-26 NOTE — PATIENT INSTRUCTIONS
Seizure, Adult  A seizure is a sudden burst of abnormal electrical and chemical activity in the brain. Seizures usually last from 30 seconds to 2 minutes. The abnormal activity temporarily interrupts normal brain function.  Many types of seizures can affect adults. A seizure can cause many different symptoms depending on where in the brain it starts.  What are the causes?  Common causes of this condition include:  Fever or infection.  Brain injury, head trauma, bleeding in the brain, or a brain tumor.  Low levels of blood sugar or salt (sodium).  Kidney problems or liver problems.  Metabolic disorders or other conditions that are passed from parent to child (are inherited).  Reaction to a substance, such as a drug or a medicine, or suddenly stopping the use of a substance (withdrawal).  A stroke.  Developmental disorders such as autism spectrum disorder or cerebral palsy.  In some cases, the cause of a seizure may not be known. Some people who have a seizure never have another one. A person who has repeated seizures over time without a clear cause has a condition called epilepsy.  What increases the risk?  You are more likely to develop this condition if:  You have a family history of epilepsy.  You have had a tonic-clonic seizure before. This type of seizure causes tightening (contraction) of the muscles of the whole body and loss of consciousness.  You have a history of head trauma, lack of oxygen at birth, or strokes.  What are the signs or symptoms?  There are many different types of seizures. The symptoms vary depending on the type of seizure you have. Symptoms occur during the seizure. They may also occur before a seizure (aura) and after a seizure (postictal). Symptoms may include the following:  Symptoms during a seizure  Uncontrollable shaking (convulsions) with fast, jerky movements of muscles.  Stiffening of the body.  Breathing problems.  Confusion, staring, or unresponsiveness.  Head nodding, eye  blinking or fluttering, or rapid eye movements.  Drooling, grunting, or making clicking sounds with your mouth.  Loss of bladder control and bowel control.  Symptoms before a seizure  Fear or anxiety.  Nausea.  Vertigo. This is a feeling like:  You are moving when you are not.  Your surroundings are moving when they are not.  Déjà vu. This is a feeling of having seen or heard something before.  Odd tastes or smells.  Changes in vision, such as seeing flashing lights or spots.  Symptoms after a seizure  Confusion.  Sleepiness.  Headache.  Sore muscles.  How is this diagnosed?  This condition may be diagnosed based on:  A description of your symptoms. Video of your seizures can be helpful.  Your medical history.  A physical exam.  You may also have tests, including:  Blood tests.  CT scan.  MRI.  Electroencephalogram (EEG). This test measures electrical activity in the brain. An EEG can predict whether seizures will return.  A spinal tap, also called a lumbar puncture. This is the removal and testing of fluid that surrounds the brain and spinal cord.  How is this treated?  Most seizures will stop on their own in less than 5 minutes, and no treatment is needed. Seizures that last longer than 5 minutes will usually need treatment.  Seizures may be treated with:  Medicines given through an IV.  Avoiding known triggers, such as medicines that you take for another condition.  Medicines to control seizures or prevent future seizures (antiepileptics), if epilepsy caused your seizures.  Medical devices to prevent and control seizures.  Surgery to stop seizures or to reduce how often seizures happen, if you have epilepsy that does not respond to medicines.  A diet low in carbohydrates and high in fat (ketogenic diet).  Follow these instructions at home:  Medicines  Take over-the-counter and prescription medicines only as told by your health care provider.  Avoid any substances that may prevent your medicine from working  properly, such as alcohol.  Activity  Follow instructions about activities, such as driving or swimming, that would be dangerous if you had another seizure. Wait until your health care provider says it is safe to do them.  If you live in the [place] check with your local department of motor vehicles (DMV) to find out about local driving laws. Each state has specific rules about when you can legally drive again.  Get enough rest. Lack of sleep can make seizures more likely to occur.  Educating others    Teach friends and family what to do if you have a seizure. They should:  Help you get down to the ground, to prevent a fall.  Cushion your head and move items away from your body.  Loosen any tight clothing around your neck.  Turn you on your side. If you vomit, this helps keep your airway clear.  Know whether or not you need emergency care.  Stay with you until you recover.  Also, tell them what not to do if you have a seizure. Tell them:  They should not hold you down. Holding you down will not stop the seizure.  They should not put anything in your mouth.  General instructions  Avoid anything that has ever triggered a seizure for you.  Keep a seizure diary. Record what you remember about each seizure, especially anything that might have triggered it.  Keep all follow-up visits. This is important.  Contact a health care provider if:  You have another seizure or seizures. Call each time you have a seizure.  Your seizure pattern changes.  You continue to have seizures with treatment.  You have symptoms of an infection or illness. Either of these might increase your risk of having a seizure.  You are unable to take your medicine.  Get help right away if:  You have:  A seizure that does not stop after 5 minutes.  Several seizures in a row without a complete recovery between seizures.  A seizure that makes it harder to breathe.  A seizure that leaves you unable to speak or use a part of your body.  You do not wake up right  away after a seizure.  You injure yourself during a seizure.  You have confusion or pain right after a seizure.  These symptoms may represent a serious problem that is an emergency. Do not wait to see if the symptoms will go away. Get medical help right away. Call your local emergency services (911 in the [place] Do not drive yourself to the hospital.  Summary  Seizures are caused by abnormal electrical and chemical activity in the brain. The activity disrupts normal brain function and can cause various symptoms.  Seizures have many causes, including illness, head injuries, low levels of blood sugar or salt, and certain conditions.  Most seizures will stop on their own in less than 5 minutes. Seizures that last longer than 5 minutes are a medical emergency and need treatment right away.  Many medicines are used to treat seizures. Take over-the-counter and prescription medicines only as told by your health care provider.  This information is not intended to replace advice given to you by your health care provider. Make sure you discuss any questions you have with your health care provider.  Document Revised: 06/25/2021 Document Reviewed: 06/25/2021  Elsevier Patient Education © 2023 Else[employer] Inc.  Migraine Headache  A migraine headache is an intense pulsing or throbbing pain on one or both sides of the head. Migraine headaches may also cause other symptoms, such as nausea, vomiting, and sensitivity to light and noise. A migraine headache can last from 4 hours to 3 days. Talk with your health care provider about what things may bring on (trigger) your migraine headaches.  What are the causes?  The exact cause is not known. However, a migraine may be caused when nerves in the brain get irritated and release chemicals that cause blood vessels to become inflamed. This inflammation causes pain. Migraines may be triggered or caused by:  Smoking.  Medicines, such as:  Nitroglycerin, which is used to treat chest pain.  Birth  control pills.  Estrogen.  Certain blood pressure medicines.  Foods or drinks that contain nitrates, glutamate, aspartame, MSG, or tyramine.  Certain foods or drinks, such as aged cheeses, chocolate, alcohol, or caffeine.  Doing physical activity that is very hard.  Other triggers may include:  Menstruation.  Pregnancy.  Hunger.  Stress.  Getting too much or too little sleep.  Weather changes.  Tiredness (fatigue).  What increases the risk?  The following factors may make you more likely to have migraine headaches:  Being between the ages of 25-55 years old.  Being female.  Having a family history of migraine headaches.  Being .  Having a mental health condition, such as depression or anxiety.  Being obese.  What are the signs or symptoms?  The main symptom of this condition is pulsing or throbbing pain. This pain may:  Happen in any area of the head, such as on one or both sides.  Make it hard to do daily activities.  Get worse with physical activity.  Get worse around bright lights, loud noises, or smells.  Other symptoms may include:  Nausea.  Vomiting.  Dizziness.  Before a migraine headache starts, you may get warning signs (an aura). An aura may include:  Seeing flashing lights or having blind spots.  Seeing bright spots, halos, or zigzag lines.  Having tunnel vision or blurred vision.  Having numbness or a tingling feeling.  Having trouble talking.  Having muscle weakness.  After a migraine ends, you may have symptoms. These may include:  Feeling tired.  Trouble concentrating.  How is this diagnosed?  A migraine headache can be diagnosed based on:  Your symptoms.  A physical exam.  Tests, such as:  A CT scan or an MRI of the head. These tests can help rule out other causes of headaches.  Taking fluid from the spine (lumbar puncture) to examine it (cerebrospinal fluid analysis, or CSF analysis).  How is this treated?  This condition may be treated with medicines that:  Relieve pain and  nausea.  Prevent migraines.  Treatment may also include:  Acupuncture.  Lifestyle changes like avoiding foods that trigger migraine headaches.  Learning ways to control your body (biofeedback).  Talk therapy to help you know and deal with negative thoughts (cognitive behavioral therapy).  Follow these instructions at home:  Medicines  Take over-the-counter and prescription medicines only as told by your provider.  Ask your provider if the medicine prescribed to you:  Requires you to avoid driving or using machinery.  Can cause constipation. You may need to take these actions to prevent or treat constipation:  Drink enough fluid to keep your pee (urine) pale yellow.  Take over-the-counter or prescription medicines.  Eat foods that are high in fiber, such as beans, whole grains, and fresh fruits and vegetables.  Limit foods that are high in fat and processed sugars, such as fried or sweet foods.  Lifestyle    Do not drink alcohol.  Do not use any products that contain nicotine or tobacco. These products include cigarettes, chewing tobacco, and vaping devices, such as e-cigarettes. If you need help quitting, ask your provider.  Get 7-9 hours of sleep each night, or the amount recommended by your provider.  Find ways to manage stress, such as meditation, deep breathing, or yoga.  Try to exercise regularly. This can help lessen how bad and how often your migraines occur.  General instructions  Keep a journal to find out what triggers your migraines, so you can avoid those things. For example, write down:  What you eat and drink.  How much sleep you get.  Any change to your diet or medicines.  If you have a migraine headache:  Avoid things that make your symptoms worse, such as bright lights.  Lie down in a dark, quiet room.  Do not drive or use machinery.  Ask your provider what activities are safe for you while you have symptoms.  Keep all follow-up visits. Your provider will monitor your symptoms and recommend any  further treatment.  Where to find more information  Coalition for Headache and Migraine Patients (CHAMP): headachemigraine.org  American Migraine Foundation: americanmigrainefoundation.org  National Headache Foundation: headaches.org  Contact a health care provider if:  You have symptoms that are different or worse than your usual migraine headache symptoms.  You have more than 15 days of headaches in one month.  Get help right away if:  Your migraine headache becomes severe or lasts more than 72 hours.  You have a fever or stiff neck.  You have vision loss.  Your muscles feel weak or like you cannot control them.  You lose your balance often or have trouble walking.  You faint.  You have a seizure.  This information is not intended to replace advice given to you by your health care provider. Make sure you discuss any questions you have with your health care provider.  Document Revised: 08/14/2023 Document Reviewed: 08/14/2023  Elsevier Patient Education © 2023 Elsevier Inc.

## 2024-03-26 NOTE — PROGRESS NOTES
"Chief Complaint  Neurologic Problem    Subjective          Zahida Meng presents to [facility]  History of Present Illness  Following up for seizure.  Denies seizure activity since previous visit.  States she's been off of all her medications for several months due to insurance issues. Having increased migraines now off propranolol, about 5 headache days per month.  Imitrex is typically effective abortive therapy.    Interval History:   History of childhood seizures, started around age 2-3.  Full body convulsions.  Stopped taking AEDs around age 13.  Reports last seizure-like activity that was whole body convulsing was about 3 years ago.  States she's continuing to experiencing staring spells, near daily.  Will be able to hear sometimes but not respond.  Family has noticed.  Also endorses severe headaches, daily in nature, with more severe episodes 4-5 times per month. Endorses photophobia, phonophobia and nausea with the headaches.  Headaches can last all day.  Improved by sleep.  States she's experiencing paresthesia to bilateral feet.       Previous preventative migraine medications: lamictal, topiramate contraindicated d/t bipolar, TCAs contraindicated d/t med interactions, propranolol   Previous abortive migraine medications: Imitrex       Objective   Vital Signs:   /87   Pulse 97   Ht 165.1 cm (65\")   Wt (!) 158 kg (348 lb 6.4 oz)   BMI 57.98 kg/m²     Physical Exam  HENT:      Head: Normocephalic.   Pulmonary:      Effort: Pulmonary effort is normal.   Neurological:      Mental Status: She is alert and oriented to person, place, and time.      Sensory: Sensation is intact.      Motor: Motor function is intact.      Coordination: Coordination is intact.      Deep Tendon Reflexes: Reflexes are normal and symmetric.        Neurologic Exam     Mental Status   Oriented to person, place, and time.        Result Review :               Assessment and Plan  "   Diagnoses and all orders for this visit:    1. Migraine without aura and without status migrainosus, not intractable (Primary)  Assessment & Plan:  Restart propranolol for preventative therapy and Imitrex PRN for abortive therapy.               2. 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  EEG positive for partial epilepsy.  Will start lacosamide.      In general, we recommend using good judgement when you are doing certain activities and to avoid those activities that if you were to have a seizure, you could harm yourself or others. In the [place] [facility], it is the law that you cannot drive within 90 days of a seizure. We also recommend not standing over open flames, not getting on high ladders or the roof, not swimming or taking baths by yourself (showers are ok) and not operating heavy machinery or power tools.         Other orders  -     lacosamide (VIMPAT) 100 MG tablet tablet; Take 1 tablet by mouth Every 12 (Twelve) Hours. 1/2 tablet at night for 5 days, then 1/2 tablet BID for 5 days, then increase to 1 tablet BID thereafter  Dispense: 60 tablet; Refill: 5  -     SUMAtriptan (IMITREX) 50 MG tablet; Take 1 tablet by mouth Every 2 (Two) Hours As Needed for Migraine. Take one tablet at onset of headache. May repeat dose one time in 2 hours if headache not relieved.  Dispense: 9 tablet; Refill: 5  -     propranolol (INDERAL) 20 MG tablet; Take 1 tablet by mouth 2 (Two) Times a Day. 1 tablet BID  Dispense: 180 tablet; Refill: 1        Follow Up   Return in about 4 months (around 7/26/2024) for seizure f/u, Migraine f/u.  Patient was given instructions and counseling regarding her condition or for health maintenance advice. Please see specific information pulled into the AVS if appropriate.

## 2024-03-26 NOTE — ASSESSMENT & PLAN NOTE
Experienced fatigue with lamictal.  EEG positive for partial epilepsy.  Will start lacosamide.      In general, we recommend using good judgement when you are doing certain activities and to avoid those activities that if you were to have a seizure, you could harm yourself or others. In the [facility], it is the law that you cannot drive within 90 days of a seizure. We also recommend not standing over open flames, not getting on high ladders or the roof, not swimming or taking baths by yourself (showers are ok) and not operating heavy machinery or power tools.

## 2024-04-11 ENCOUNTER — OFFICE VISIT (OUTPATIENT)
Dept: ORTHOPEDIC SURGERY | Facility: CLINIC | Age: 29
End: 2024-04-11
Payer: OTHER MISCELLANEOUS

## 2024-04-11 VITALS
SYSTOLIC BLOOD PRESSURE: 173 MMHG | HEIGHT: 65 IN | HEART RATE: 94 BPM | WEIGHT: 293 LBS | BODY MASS INDEX: 48.82 KG/M2 | DIASTOLIC BLOOD PRESSURE: 100 MMHG | OXYGEN SATURATION: 98 %

## 2024-04-11 DIAGNOSIS — M75.82 ROTATOR CUFF TENDONITIS, LEFT: ICD-10-CM

## 2024-04-11 DIAGNOSIS — S49.92XA INJURY OF LEFT SHOULDER, INITIAL ENCOUNTER: Primary | ICD-10-CM

## 2024-04-11 NOTE — PROGRESS NOTES
"Chief Complaint  Initial Evaluation and Pain of the Left Shoulder     Subjective      Zahida Meng presents to [facility] for evaluation of the left shoulder. The patient reports left shoulder pain for a couple months after a work injury. She reports pain with ROM and weakness.     Allergies   Allergen Reactions    Amoxicillin Hives    Augmentin [Amoxicillin-Pot Clavulanate] Hives    Doxycycline Rash        Social History     Socioeconomic History    Marital status: Single   Tobacco Use    Smoking status: Former     Current packs/day: 0.25     Average packs/day: 0.3 packs/day for 8.0 years (2.0 ttl pk-yrs)     Types: Cigarettes, Electronic Cigarette    Smokeless tobacco: Never   Vaping Use    Vaping status: Every Day    Substances: Nicotine, Flavoring    Devices: Disposable   Substance and Sexual Activity    Alcohol use: Not Currently    Drug use: Yes     Frequency: 7.0 times per week     Types: Marijuana    Sexual activity: Yes     Partners: Male     Birth control/protection: None        I reviewed the patient's chief complaint, history of present illness, review of systems, past medical history, surgical history, family history, social history, medications, and allergy list.     Review of Systems     Constitutional: Denies fevers, chills, weight loss  Cardiovascular: Denies chest pain, shortness of breath  Skin: Denies rashes, acute skin changes  Neurologic: Denies headache, loss of consciousness  MSK: Left shoulder pain      Vital Signs:   /100   Pulse 94   Ht 165.1 cm (65\")   Wt (!) 158 kg (348 lb)   SpO2 98%   BMI 57.91 kg/m²          Physical Exam  General: Alert. No acute distress    Ortho Exam      Left shoulder- tender to the anterior shoulder. Forward elevation 165 with pain. Abduction 135. External Rotation 85. Internal rotation 75. Sensation to light touch median, radial, ulnar nerve. Positive AIN, PIN, ulnar nerve motor function. Positive pulses. 4/5 " supraspinatus strength/ 4+ infraspinatus and 5/5 subscapularis. Internal rotation to T-12. Positive impingement. Pain with cross arm adduction negative O'marc.     Procedures      Imaging Results (Most Recent)       None             Result Review :       No results found.           Assessment and Plan     Diagnoses and all orders for this visit:    1. Injury of left shoulder, initial encounter (Primary)    2. Rotator cuff tendonitis, left        Discussed the treatment plan with the patient.  I reviewed the recent x-rays with the patient. Plan for MRI of the left shoulder to evaluate the rotator cuff. The patient expressed understanding and wished to proceed.     Call or return if worsening symptoms.    Follow Up     MRI results      Patient was given instructions and counseling regarding her condition or for health maintenance advice. Please see specific information pulled into the AVS if appropriate.     Scribed for Chip Mcclure MD by Elvia Braswell.  04/11/24   14:41 EDT    I have personally performed the services described in this document as scribed by the above individual and it is both accurate and complete. Chip Mcclure MD 04/12/24

## 2024-04-15 ENCOUNTER — TELEPHONE (OUTPATIENT)
Dept: ORTHOPEDIC SURGERY | Facility: CLINIC | Age: 29
End: 2024-04-15
Payer: COMMERCIAL

## 2024-04-15 NOTE — TELEPHONE ENCOUNTER
Caller: Zahida Meng    Relationship: Self    Best call back number: [phone] (home)       What form or medical record are you requesting: OFFICE VISIT FROM 4-11-24 AND WORK STATUS     Who is requesting this form or medical record from you: [employer]     How would you like to receive the form or medical records (pick-up, mail, fax): [phone]      Additional notes: CINDY KATRIN -    PHONE [phone]  CLAIM #: [id number]

## 2024-04-26 DIAGNOSIS — S49.92XA INJURY OF LEFT SHOULDER, INITIAL ENCOUNTER: ICD-10-CM

## 2024-04-26 DIAGNOSIS — M75.82 ROTATOR CUFF TENDONITIS, LEFT: ICD-10-CM

## 2024-05-07 ENCOUNTER — OFFICE VISIT (OUTPATIENT)
Dept: ORTHOPEDIC SURGERY | Facility: CLINIC | Age: 29
End: 2024-05-07
Payer: OTHER MISCELLANEOUS

## 2024-05-07 VITALS
OXYGEN SATURATION: 97 % | WEIGHT: 293 LBS | BODY MASS INDEX: 48.82 KG/M2 | DIASTOLIC BLOOD PRESSURE: 89 MMHG | SYSTOLIC BLOOD PRESSURE: 139 MMHG | HEART RATE: 110 BPM | HEIGHT: 65 IN

## 2024-05-07 DIAGNOSIS — M25.412 SHOULDER EFFUSION, LEFT: ICD-10-CM

## 2024-05-07 DIAGNOSIS — M19.019 OSTEOARTHRITIS OF AC (ACROMIOCLAVICULAR) JOINT: ICD-10-CM

## 2024-05-07 DIAGNOSIS — S49.92XA INJURY OF LEFT SHOULDER, INITIAL ENCOUNTER: Primary | ICD-10-CM

## 2024-05-07 PROCEDURE — 99213 OFFICE O/P EST LOW 20 MIN: CPT | Performed by: ORTHOPAEDIC SURGERY

## 2024-05-07 RX ORDER — NAPROXEN 500 MG/1
500 TABLET ORAL 2 TIMES DAILY
Qty: 60 TABLET | Refills: 0 | Status: SHIPPED | OUTPATIENT
Start: 2024-05-07

## 2024-05-07 RX ORDER — IBUPROFEN 200 MG
200 TABLET ORAL EVERY 6 HOURS PRN
COMMUNITY

## 2024-05-17 ENCOUNTER — TELEPHONE (OUTPATIENT)
Dept: ORTHOPEDIC SURGERY | Facility: CLINIC | Age: 29
End: 2024-05-17
Payer: COMMERCIAL

## 2024-05-17 NOTE — TELEPHONE ENCOUNTER
[employer] CALLED TO ASK WHAT SPECIFIC RESTRICTIONS PATIENT HAS WHILE BEING OFF OF WORK UNTIL NEXT APPOINTMENT. PLEASE UPDATE LAST WORK NOTE WITH RESTRICTIONS OF WHAT PATIENT CAN AND CAN NOT DO AND FAX TO [facility] [phone] ATTN: MARYBETH

## 2024-06-04 ENCOUNTER — OFFICE VISIT (OUTPATIENT)
Dept: ORTHOPEDIC SURGERY | Facility: CLINIC | Age: 29
End: 2024-06-04
Payer: OTHER MISCELLANEOUS

## 2024-06-04 VITALS — BODY MASS INDEX: 48.82 KG/M2 | HEIGHT: 65 IN | WEIGHT: 293 LBS

## 2024-06-04 DIAGNOSIS — S49.92XA INJURY OF LEFT SHOULDER, INITIAL ENCOUNTER: Primary | ICD-10-CM

## 2024-06-04 DIAGNOSIS — M75.82 ROTATOR CUFF TENDONITIS, LEFT: ICD-10-CM

## 2024-06-04 DIAGNOSIS — R20.2 PARESTHESIA OF LEFT UPPER EXTREMITY: ICD-10-CM

## 2024-06-04 DIAGNOSIS — M25.412 SHOULDER EFFUSION, LEFT: ICD-10-CM

## 2024-06-04 DIAGNOSIS — M54.2 CERVICALGIA: ICD-10-CM

## 2024-06-04 DIAGNOSIS — M19.019 OSTEOARTHRITIS OF AC (ACROMIOCLAVICULAR) JOINT: ICD-10-CM

## 2024-06-04 PROCEDURE — 99213 OFFICE O/P EST LOW 20 MIN: CPT | Performed by: PHYSICIAN ASSISTANT

## 2024-06-04 NOTE — PROGRESS NOTES
Chief Complaint  Follow-up of the Left Shoulder    Subjective          History of Present Illness      Zahida Meng is a 29 y.o. female  presents to [facility] for     Patient presents for follow-up evaluation of left shoulder pain left shoulder AC arthritis.  She saw Dr. Mcclure to review her shoulder MRI.  She injured her shoulder at work when she lifted a dog.  She states she has had no improvement to her left shoulder.  She states she cannot  heavy items and she also has left hand numbness and arm numbness and tingling.  She states she has a history of neck pain she states she had a neck fracture when she was younger and if used incorrectly.      Allergies   Allergen Reactions    Amoxicillin Hives    Augmentin [Amoxicillin-Pot Clavulanate] Hives    Doxycycline Rash        Social History     Socioeconomic History    Marital status: Single   Tobacco Use    Smoking status: Former     Current packs/day: 0.25     Average packs/day: 0.3 packs/day for 8.0 years (2.0 ttl pk-yrs)     Types: Cigarettes, Electronic Cigarette    Smokeless tobacco: Never   Vaping Use    Vaping status: Every Day    Substances: Nicotine, Flavoring    Devices: Disposable   Substance and Sexual Activity    Alcohol use: Not Currently    Drug use: Yes     Frequency: 7.0 times per week     Types: Marijuana    Sexual activity: Yes     Partners: Male     Birth control/protection: None        REVIEW OF SYSTEMS    Constitutional: Awake alert and oriented x3, no acute distress, denies fevers, chills, weight loss  Respiratory: No respiratory distress  Vascular: Brisk cap refill, Intact distal pulses, No cyanosis, compartments soft with no signs or symptoms of compartment syndrome or DVT.   Cardiovascular: Denies chest pain, shortness of breath  Skin: Denies rashes, acute skin changes  Neurologic: Denies headache, loss of consciousness  MSK: Left shoulder pain, left sided neck pain, left upper extremity  "paresthesia      Objective   Vital Signs:   Ht 165.1 cm (65\")   Wt (!) 158 kg (348 lb)   BMI 57.91 kg/m²     Body mass index is 57.91 kg/m².    Physical Exam       Left shoulder- tender to the anterior shoulder. Forward elevation 165 with pain. Abduction 135. External Rotation 85. Internal rotation 75. Sensation to light touch median, radial, ulnar nerve. Positive AIN, PIN, ulnar nerve motor function. Positive pulses. 4/5 supraspinatus strength/ 4+ infraspinatus and 5/5 subscapularis. Internal rotation to T-12. Positive impingement. Pain with cross arm adduction negative O'marc.   Cervical spine: Pain and stiffness with flexion extension, rotation and lateral movements, tender to palpation of bilateral paraspinal muscles, neurovascularly intact left upper extremity      Procedures    Imaging Results (Most Recent)       None             Result Review :   The following data was reviewed by: ARMANI Arango on 06/04/2024:               Assessment and Plan    Diagnoses and all orders for this visit:    1. Injury of left shoulder, initial encounter (Primary)    2. Shoulder effusion, left    3. Osteoarthritis of AC (acromioclavicular) joint    4. Rotator cuff tendonitis, left    5. Cervicalgia  -     MRI Cervical Spine Without Contrast; Future    6. Paresthesia of left upper extremity  -     MRI Cervical Spine Without Contrast; Future        Discussed diagnosis and treatment options with the patient she was advised we will order MRI of cervical spine for further evaluation due to no resolution with left upper extremity numbness and tingling and pain follow-up after MRI.  Remain off of work    Call or return if worsening symptoms.    Follow Up   Return for After MRI.  Patient was given instructions and counseling regarding her condition or for health maintenance advice. Please see specific information pulled into the AVS if appropriate.       EMR Dragon/Transcription disclaimer:  Part of this note may be an " electronic transcription/translation of spoken language to printed text using the Dragon Dictation System

## 2024-06-05 ENCOUNTER — TELEPHONE (OUTPATIENT)
Dept: ORTHOPEDIC SURGERY | Facility: CLINIC | Age: 29
End: 2024-06-05
Payer: COMMERCIAL

## 2024-06-05 NOTE — TELEPHONE ENCOUNTER
Relationship to Patient: [facility]   Phone Number: [phone] EXT [phone]   Reason for Call: CALLING STATING THEY RECEIVED THE ORDER FOR THE MRI AND HAVE THE PATIENT SCHEDULED BUT THEY ARE UNSURE IF WORK COMP IS GOING TO APPROVE IT BECAUSE TH PATIENTS LAST MRI THRU WORK COMP WAS DONE ELSEWHERE

## 2024-06-05 NOTE — TELEPHONE ENCOUNTER
AUTH REQUEST SENT TO PT WORK COMP  - SPOKE WITH BETTIE @[facility] WILL LET THEM KNOW WHEN WE RECEIVE AUTH

## 2024-06-11 DIAGNOSIS — M54.2 CERVICALGIA: ICD-10-CM

## 2024-06-11 DIAGNOSIS — R20.2 PARESTHESIA OF LEFT UPPER EXTREMITY: ICD-10-CM

## 2024-06-21 ENCOUNTER — OFFICE VISIT (OUTPATIENT)
Dept: ORTHOPEDIC SURGERY | Facility: CLINIC | Age: 29
End: 2024-06-21
Payer: COMMERCIAL

## 2024-06-21 VITALS — HEIGHT: 65 IN | BODY MASS INDEX: 48.82 KG/M2 | WEIGHT: 293 LBS

## 2024-06-21 DIAGNOSIS — S49.92XD INJURY OF LEFT SHOULDER, SUBSEQUENT ENCOUNTER: ICD-10-CM

## 2024-06-21 DIAGNOSIS — M75.82 ROTATOR CUFF TENDONITIS, LEFT: ICD-10-CM

## 2024-06-21 DIAGNOSIS — M19.019 OSTEOARTHRITIS OF AC (ACROMIOCLAVICULAR) JOINT: Primary | ICD-10-CM

## 2024-06-21 DIAGNOSIS — R20.2 LEFT HAND PARESTHESIA: ICD-10-CM

## 2024-06-21 RX ORDER — LIDOCAINE HYDROCHLORIDE 10 MG/ML
5 INJECTION, SOLUTION INFILTRATION; PERINEURAL
Status: COMPLETED | OUTPATIENT
Start: 2024-06-21 | End: 2024-06-21

## 2024-06-21 RX ORDER — TRIAMCINOLONE ACETONIDE 40 MG/ML
40 INJECTION, SUSPENSION INTRA-ARTICULAR; INTRAMUSCULAR
Status: COMPLETED | OUTPATIENT
Start: 2024-06-21 | End: 2024-06-21

## 2024-06-21 RX ADMIN — LIDOCAINE HYDROCHLORIDE 5 ML: 10 INJECTION, SOLUTION INFILTRATION; PERINEURAL at 14:52

## 2024-06-21 RX ADMIN — TRIAMCINOLONE ACETONIDE 40 MG: 40 INJECTION, SUSPENSION INTRA-ARTICULAR; INTRAMUSCULAR at 14:52

## 2024-06-21 NOTE — PROGRESS NOTES
Chief Complaint  Follow-up of the Left Shoulder    Subjective          History of Present Illness      Zahida Meng is a 29 y.o. female  presents to [facility] for     Patient presents for follow-up evaluation of left shoulder pain left shoulder AC arthritis.  She saw Dr. Mcclure in the past visits to review her shoulder MRI.  She injured her shoulder at work when she lifted a dog.  She states she has had no improvement to her left shoulder.  She states she cannot  heavy items and she also has left hand numbness and arm numbness and tingling.  She states she has a history of neck pain she states she had a neck fracture when she was younger and if fused incorrectly.  She states the first and second finger will go numb and she states she thinks she has carpal tunnel syndrome.  She has remained off of work.  She had MRI of her cervical spine and is here to review this       Allergies   Allergen Reactions    Amoxicillin Hives    Augmentin [Amoxicillin-Pot Clavulanate] Hives    Doxycycline Rash        Social History     Socioeconomic History    Marital status: Single   Tobacco Use    Smoking status: Former     Current packs/day: 0.25     Average packs/day: 0.3 packs/day for 8.0 years (2.0 ttl pk-yrs)     Types: Cigarettes, Electronic Cigarette    Smokeless tobacco: Never   Vaping Use    Vaping status: Every Day    Substances: Nicotine, Flavoring    Devices: Disposable   Substance and Sexual Activity    Alcohol use: Not Currently    Drug use: Yes     Frequency: 7.0 times per week     Types: Marijuana    Sexual activity: Yes     Partners: Male     Birth control/protection: None        REVIEW OF SYSTEMS    Constitutional: Awake alert and oriented x3, no acute distress, denies fevers, chills, weight loss  Respiratory: No respiratory distress  Vascular: Brisk cap refill, Intact distal pulses, No cyanosis, compartments soft with no signs or symptoms of compartment syndrome or DVT.  "  Cardiovascular: Denies chest pain, shortness of breath  Skin: Denies rashes, acute skin changes  Neurologic: Denies headache, loss of consciousness  MSK: Left shoulder pain      Objective   Vital Signs:   Ht 165.1 cm (65\")   Wt (!) 158 kg (348 lb)   BMI 57.91 kg/m²     Body mass index is 57.91 kg/m².    Physical Exam         Left shoulder- tender to the anterior shoulder. Forward elevation 165 with pain. Abduction 135. External Rotation 85. Internal rotation 75. Sensation to light touch median, radial, ulnar nerve. Positive AIN, PIN, ulnar nerve motor function. Positive pulses. 4/5 supraspinatus strength/ 4+ infraspinatus and 5/5 subscapularis. Internal rotation to T-12. Positive impingement. Pain with cross arm adduction negative O'marc.   Cervical spine: Pain and stiffness with flexion extension, rotation and lateral movements, tender to palpation of bilateral paraspinal muscles, neurovascularly intact left upper extremity      Large Joint  Date/Time: 6/21/2024 2:52 PM  Consent given by: patient  Site marked: site marked  Timeout: Immediately prior to procedure a time out was called to verify the correct patient, procedure, equipment, support staff and site/side marked as required   Supporting Documentation  Indications: pain   Procedure Details  Location: shoulder - Shoulder joint: LEFT.  Preparation: Patient was prepped and draped in the usual sterile fashion  Needle gauge: 21g.  Medications administered: 5 mL lidocaine 1 %; 40 mg triamcinolone acetonide 40 MG/ML  Patient tolerance: patient tolerated the procedure well with no immediate complications    This injection documentation was Scribed for ARMANI Arango by Alisia Sanchez MA.  06/21/24   14:53 EDT    Imaging Results (Most Recent)       None        MRI cervical spine without contrast, 6/10/2024, impression: Mild degenerative changes in the cervical spine.  Examination is limited due to motion artifact    Result Review :   The " following data was reviewed by: Alisia Sanchez MA on 06/21/2024:  Data reviewed : Radiologic studies reviewed by me with the patient              Assessment and Plan    Diagnoses and all orders for this visit:    1. Osteoarthritis of AC (acromioclavicular) joint (Primary)    2. Rotator cuff tendonitis, left    3. Injury of left shoulder, subsequent encounter  -     EMG & Nerve Conduction Test; Future    4. Left hand paresthesia  -     EMG & Nerve Conduction Test; Future        Reviewed MRI with the patient discussed diagnosis and treatment options with her she was advised recommend nerve conduction testing, start therapy and she elected to have left shoulder steroid injection which she tolerated well follow-up after nerve test, remain off of work per patient request    Call or return if worsening symptoms.    Follow Up   Return for After EMG/NCV.  Patient was given instructions and counseling regarding her condition or for health maintenance advice. Please see specific information pulled into the AVS if appropriate.       EMR Dragon/Transcription disclaimer:  Part of this note may be an electronic transcription/translation of spoken language to printed text using the Dragon Dictation System

## 2024-07-01 ENCOUNTER — HOSPITAL ENCOUNTER (OUTPATIENT)
Facility: HOSPITAL | Age: 29
Discharge: HOME | End: 2024-07-01
Admitting: PHYSICIAN ASSISTANT
Payer: OTHER MISCELLANEOUS

## 2024-07-01 DIAGNOSIS — R20.2 LEFT HAND PARESTHESIA: ICD-10-CM

## 2024-07-01 DIAGNOSIS — S49.92XD INJURY OF LEFT SHOULDER, SUBSEQUENT ENCOUNTER: ICD-10-CM

## 2024-07-01 PROCEDURE — 95909 NRV CNDJ TST 5-6 STUDIES: CPT

## 2024-07-01 PROCEDURE — 95886 MUSC TEST DONE W/N TEST COMP: CPT

## 2024-07-11 ENCOUNTER — TREATMENT (OUTPATIENT)
Dept: PHYSICAL THERAPY | Facility: CLINIC | Age: 29
End: 2024-07-11
Payer: OTHER MISCELLANEOUS

## 2024-07-11 DIAGNOSIS — M54.2 CERVICAL PAIN: Primary | ICD-10-CM

## 2024-07-11 DIAGNOSIS — M54.12 RADICULOPATHY, CERVICAL: ICD-10-CM

## 2024-07-11 NOTE — PROGRESS NOTES
Physical Therapy Initial Evaluation and Plan of Care                       Patient: Zahida Meng   : 1995  Diagnosis/ICD-10 Code:  Cervical pain [M54.2]  Referring practitioner: Edgard Mercado*  Date of Initial Visit: 2024  Today's Date: 2024  Patient seen for 1 sessions           Subjective Questionnaire: NDI:25      Subjective Evaluation    History of Present Illness  Mechanism of injury: Patient states back in 2024 she was throwing 50# bags of dog food when tossing one bag she felt instant tingling and burning travel down her left arm. She feels the issue is from her neck, not her shoulder. She states symptoms will start at the middle of her shoulder blade, travel down the backside of the arm, the underside of the forearm, and then goes to her thumb and forefinger. She reports no symptoms like this prior to January but does report intermittent tennis elbow in the left hand. She states  she was grooming a 200lb dog that dragged her by the left arm that worsened symptoms. She has avoided using the arm due to pain.       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  Patient Goals  Patient goals for therapy: return to work, independence with ADLs/IADLs, increased strength, decreased pain, increased motion and return to sport/leisure activities             Objective          Active Range of Motion   Cervical/Thoracic Spine   Cervical    Flexion: 24 degrees   Extension: 78 degrees   Left lateral flexion: 38 (on right) degrees with pain  Right lateral flexion: 39 degrees   Left rotation: 31 degrees with pain  Right rotation: 73 degrees      Right Shoulder   Flexion: 95 degrees with pain  Abduction: 94 degrees with pain  External rotation BTH: C2 with pain      See Exercise, Manual, and Modality Logs for complete treatment.     Assessment & Plan       Assessment  Impairments: abnormal muscle firing, abnormal muscle tone, abnormal or restricted ROM, activity intolerance, impaired physical strength, lacks appropriate home exercise program, pain with function and safety issue   Functional limitations: carrying objects, lifting, pulling, pushing, uncomfortable because of pain and unable to perform repetitive tasks   Assessment details: The patient presents to physical therapy with complaints of neck and left shoulder pain. Signs and symptoms are consistent with cervical radiculopathy of the left side affecting the radial and median nerves. She would benefit from skilled physical therapy as described below to address these limitations and allow the patient to return to her prior level of function.    Prognosis: good    Goals  Plan Goals: CERVICAL PROBLEMS    1. The patient has limited ROM.    LTG 1: 12 weeks:  The patient will demonstrate 70° of left cervical spine rotation of in order to adequately monitor blind spots while driving and improve ability to perform activities of daily living.    STATUS:  New  STG 1a: 6 weeks:  The patient will demonstrate 60° of left cervical spine rotation in order to adequately monitor blind spots while driving and improve ability to perform activities of daily living.       STATUS:  New     2. The patient has complaints of pain.   LTG 2: 12 weeks:  The patient will report 1/10 pain in order to more easily tolerate activities of daily living and improve sleep quality.    STATUS:  New   STG 2a: 6 weeks:  The patient will report 3/10 pain.    STATUS:  New    3. The patient reports radicular symptoms in the left upper extremity.   LTG 3: 12 weeks:  The patient will report a decrease in radicular symptoms in the left upper  extremity by 90%.    STATUS:  New   STG 3a: 6 weeks:  The patient will report a decrease in radicular symptoms in the left upper extremity by 50%.    STATUS:  New    4. Carrying, Moving, and Handling Objects Functional Limitation     LTG 4: 12 weeks:  The patient will demonstrate improved function by achieving a score of 5 on the Neck Disability Index.    STATUS:  New   STG 4a: 6 weeks:  The patient will demonstrate improved function by achieving a score of 10 on the Neck Disability Index.      STATUS:  New     Plan  Therapy options: will be seen for skilled therapy services  Planned modality interventions: cryotherapy, electrical stimulation/Russian stimulation, TENS, traction, ultrasound and dry needling  Planned therapy interventions: ADL retraining, soft tissue mobilization, strengthening, stretching, therapeutic activities, joint mobilization, home exercise program, functional ROM exercises, flexibility, body mechanics training, postural training, neuromuscular re-education, manual therapy, motor coordination training, spinal/joint mobilization and IADL retraining  Frequency: 3x week  Duration in weeks: 12  Treatment plan discussed with: patient        Visit Diagnoses:    ICD-10-CM ICD-9-CM   1. Cervical pain  M54.2 723.1   2. Radiculopathy, cervical  M54.12 723.4       History # of Personal Factors and/or Comorbidities: MODERATE (1-2)  Examination of Body System(s): # of elements: MODERATE (3)  Clinical Presentation: EVOLVING  Clinical Decision Making: MODERATE      Timed:         Manual Therapy:    0     mins  50032;     Therapeutic Exercise:    20     mins  85533;     Neuromuscular Darrin:    0    mins  54838;    Therapeutic Activity:     0     mins  85401;     Gait Trainin     mins  81003;     Ultrasound:     0     mins  40906;    Ionto                               0    mins   98501  Self Care                       0     mins   41710  Canalith Repos    0     mins 19933      Un-Timed:  Electrical  Stimulation:    0     mins  42336 ( );  Dry Needling     0     mins self-pay  Traction     0     mins 95323  Low Eval     0     Mins  55286  Mod Eval     35     Mins  03280  High Eval                       0     Mins  92197  Re-Eval                           0    mins  79684    Timed Treatment:   20   mins   Total Treatment:     55   mins    PT SIGNATURE: Ronaldo Hoskins PT    Electronically signed 7/11/2024    [place] License: PT - [id number]      Initial Certification  Certification Period: 7/11/2024 thru 10/8/2024  I certify that the therapy services are furnished while this patient is under my care.  The services outlined above are required by this patient, and will be reviewed every 90 days.     PHYSICIAN: Edgard Lopez PA  NPI: [id number]      DATE:     Please sign and return via fax to [phone]. Thank you, [facility].

## 2024-07-15 ENCOUNTER — TELEPHONE (OUTPATIENT)
Dept: PHYSICAL THERAPY | Facility: OTHER | Age: 29
End: 2024-07-15
Payer: COMMERCIAL

## 2024-07-15 NOTE — TELEPHONE ENCOUNTER
Caller: Zahida Meng    Relationship: Self      What was the call regarding: PATIENT CANCELLED TODAY DUE TO TRANSPORTATION ISSUE

## 2024-07-18 ENCOUNTER — TELEPHONE (OUTPATIENT)
Dept: PHYSICAL THERAPY | Facility: CLINIC | Age: 29
End: 2024-07-18
Payer: COMMERCIAL

## 2024-07-18 NOTE — TELEPHONE ENCOUNTER
Left voicemail for patient as a reminder of our no show policy and to confirm her next appointment on Monday. Call back number was left for any questions and to cancel any future appointments if she is unable to make it.

## 2024-07-22 ENCOUNTER — TREATMENT (OUTPATIENT)
Dept: PHYSICAL THERAPY | Facility: CLINIC | Age: 29
End: 2024-07-22
Payer: OTHER MISCELLANEOUS

## 2024-07-22 DIAGNOSIS — M54.2 CERVICAL PAIN: Primary | ICD-10-CM

## 2024-07-22 DIAGNOSIS — M54.12 RADICULOPATHY, CERVICAL: ICD-10-CM

## 2024-07-22 PROCEDURE — 97140 MANUAL THERAPY 1/> REGIONS: CPT

## 2024-07-22 PROCEDURE — 97530 THERAPEUTIC ACTIVITIES: CPT

## 2024-07-22 NOTE — PROGRESS NOTES
Outpatient Physical Therapy                   Physical Therapy Daily Treatment Note    Patient: Zahida Meng   : 1995  Diagnosis/ICD-10 Code:  Cervical pain [M54.2]  Referring practitioner: Edgard Mercado*  Date of Initial Visit: Type: THERAPY  Noted: 2024  Today's Date: 2024  Patient seen for 2 sessions             Subjective   Zahida Meng reports: she looked up her home exercises on You Tube for further direction. No questions or concerns with HEP.     Pain: 5/10 pain, neck pain at time of arrival.     Objective     See Exercise, Manual, and Modality Logs for complete treatment.     Assessment/Plan  Zahida is just beginning care to attend to deficits outlined in evaluation, requiring further therapist directed strengthening. Patient was very tender to manual therapy techniques. Assess how patient tolerated treatment next session.     Progress per Plan of Care      Timed:  Manual Therapy:    18     mins  71029;  Therapeutic Exercise:    0     mins  68045;     Neuromuscular Darrin:    0    mins  89282;    Therapeutic Activity:     8     mins  96148;     Gait Trainin     mins  86914;    Aquatic Therapy:     0     mins  24256;       Untimed:  Electrical Stimulation:    0     mins  87823 ( );  Mechanical Traction:    0     mins  94339;       Timed Treatment:   26   mins   Total Treatment:     26   mins      Electronically signed:   Mary Edgar PTA  Physical Therapist Assistant  [facility] License #: [id number]

## 2024-07-24 ENCOUNTER — TREATMENT (OUTPATIENT)
Dept: PHYSICAL THERAPY | Facility: CLINIC | Age: 29
End: 2024-07-24
Payer: OTHER MISCELLANEOUS

## 2024-07-24 DIAGNOSIS — M54.2 CERVICAL PAIN: Primary | ICD-10-CM

## 2024-07-24 DIAGNOSIS — M54.12 RADICULOPATHY, CERVICAL: ICD-10-CM

## 2024-07-24 NOTE — PROGRESS NOTES
"Outpatient Physical Therapy                   Physical Therapy Daily Treatment Note    Patient: Zahida Meng   : 1995  Diagnosis/ICD-10 Code:  Cervical pain [M54.2]  Referring practitioner: Edgard Mercado*  Date of Initial Visit: Type: THERAPY  Noted: 2024  Today's Date: 2024  Patient seen for 3 sessions             Subjective   Zahida Meng reports: she felt okay after last session, she was just tired. Patient states she doesn't use her left UE hardly at all; \"the only time I can get it (her left UE) over my head is when I stretch but if I think about it I can't. \"    Pain: 0/10 pain, \"I'm just sore today.\"     Objective     See Exercise, Manual, and Modality Logs for complete treatment.     Assessment/Plan  Zahida is just beginning care to attend to deficits outlined in evaluation, strengthening exercises were added and patient required further therapist directed strengthening. Assess how patient tolerated treatment next session.    Progress per Plan of Care      Timed:  Manual Therapy:    0     mins  18731;  Therapeutic Exercise:    16     mins  90078;     Neuromuscular Darrin:    0    mins  28259;    Therapeutic Activity:     10     mins  54988;     Gait Trainin     mins  80228;    Aquatic Therapy:     0     mins  01902;       Untimed:  Electrical Stimulation:    0     mins  57921 ( );  Mechanical Traction:    0     mins  86487;       Timed Treatment:   26   mins   Total Treatment:     26   mins      Electronically signed:   Mary Edgar PTA  Physical Therapist Assistant  [facility] License #: [id number]  "

## 2024-07-31 ENCOUNTER — TREATMENT (OUTPATIENT)
Dept: PHYSICAL THERAPY | Facility: CLINIC | Age: 29
End: 2024-07-31
Payer: OTHER MISCELLANEOUS

## 2024-07-31 DIAGNOSIS — M54.2 CERVICAL PAIN: Primary | ICD-10-CM

## 2024-07-31 DIAGNOSIS — M54.12 RADICULOPATHY, CERVICAL: ICD-10-CM

## 2024-07-31 NOTE — PROGRESS NOTES
"Outpatient Physical Therapy                   Physical Therapy Daily Treatment Note    Patient: Zahida Meng   : 1995  Diagnosis/ICD-10 Code:  Cervical pain [M54.2]  Referring practitioner: Edgard Mercado*  Date of Initial Visit: Type: THERAPY  Noted: 2024  Today's Date: 2024  Patient seen for 4 sessions             Subjective   Zahida Meng reports: she didn't sleep well last night because her dogs were reacting to the storm and rain.     Pain: 5/10 pain, at time of arrival located in her neck.     Patient was asked about how she felt after last session and she replied \"I went home and went to bed, my arm was just done.\" (fatigued and painful)     Objective     See Exercise, Manual, and Modality Logs for complete treatment.     Assessment/Plan  Zahida still experiencing increased neck pain. Pt tolerated exercises well, but did have some difficulty gripping the band with the left hand. Pt would benefit from skilled PT to address Range of Motion  and Strength deficits, pain management and any concerns with ADLs.     Progress per Plan of Care      Timed:  Manual Therapy:    10     mins  60012;  Therapeutic Exercise:    8     mins  93882;     Neuromuscular Darrin:    0    mins  50009;    Therapeutic Activity:     8     mins  30707;     Gait Trainin     mins  83285;    Aquatic Therapy:     0     mins  73651;       Untimed:  Electrical Stimulation:    0     mins  65954 ( );  Mechanical Traction:    0     mins  78632;       Timed Treatment:   26   mins   Total Treatment:     26   mins      Electronically signed:   Mary Edgar PTA  Physical Therapist Assistant  [facility] License #: [id number]  "

## 2024-08-05 ENCOUNTER — OFFICE VISIT (OUTPATIENT)
Dept: ORTHOPEDIC SURGERY | Facility: CLINIC | Age: 29
End: 2024-08-05
Payer: OTHER MISCELLANEOUS

## 2024-08-05 VITALS — OXYGEN SATURATION: 97 % | HEART RATE: 107 BPM | DIASTOLIC BLOOD PRESSURE: 86 MMHG | SYSTOLIC BLOOD PRESSURE: 120 MMHG

## 2024-08-05 DIAGNOSIS — S49.92XD INJURY OF LEFT SHOULDER, SUBSEQUENT ENCOUNTER: Primary | ICD-10-CM

## 2024-08-05 DIAGNOSIS — M19.019 OSTEOARTHRITIS OF AC (ACROMIOCLAVICULAR) JOINT: ICD-10-CM

## 2024-08-05 PROCEDURE — 99213 OFFICE O/P EST LOW 20 MIN: CPT | Performed by: PHYSICIAN ASSISTANT

## 2024-08-05 RX ORDER — LAMOTRIGINE 200 MG/1
1 TABLET ORAL EVERY 12 HOURS SCHEDULED
COMMUNITY
Start: 2024-07-23

## 2024-08-05 NOTE — PROGRESS NOTES
Chief Complaint  Follow-up of the Left Arm    Subjective          History of Present Illness      Zahida Meng is a 29 y.o. female  presents to [facility] for     Patient presents for follow-up evaluation of left shoulder pain and left shoulder injury she injured her shoulder when she was lifting a dog at her workplace.  She has had MRI of the shoulder MRI of the cervical spine and at last visit we ordered nerve conduction testing for the left upper extremity due to hand numbness and tingling in upper extremity numbness and tingling.  She is here to review the nerve conduction test.  She has been to therapy 3 weeks she states she has had no improvement.  At last visit she also received a steroid injection which she states helped initially up with the pain but has not helped her with range of motion and has worn off at this point.      Allergies   Allergen Reactions    Amoxicillin Hives    Augmentin [Amoxicillin-Pot Clavulanate] Hives    Doxycycline Rash        Social History     Socioeconomic History    Marital status: Single   Tobacco Use    Smoking status: Former     Current packs/day: 0.25     Average packs/day: 0.3 packs/day for 8.0 years (2.0 ttl pk-yrs)     Types: Cigarettes, Electronic Cigarette    Smokeless tobacco: Never   Vaping Use    Vaping status: Every Day    Substances: Nicotine, Flavoring    Devices: Disposable   Substance and Sexual Activity    Alcohol use: Not Currently    Drug use: Yes     Frequency: 7.0 times per week     Types: Marijuana    Sexual activity: Yes     Partners: Male     Birth control/protection: None        REVIEW OF SYSTEMS    Constitutional: Awake alert and oriented x3, no acute distress, denies fevers, chills, weight loss  Respiratory: No respiratory distress  Vascular: Brisk cap refill, Intact distal pulses, No cyanosis, compartments soft with no signs or symptoms of compartment syndrome or DVT.   Cardiovascular: Denies chest pain, shortness  of breath  Skin: Denies rashes, acute skin changes  Neurologic: Denies headache, loss of consciousness  MSK: Left shoulder pain      Objective   Vital Signs:   /86   Pulse 107   SpO2 97%     There is no height or weight on file to calculate BMI.    Physical Exam          Left shoulder- tender to the anterior shoulder.  Active forward elevation 90 with pain, passive forward elevation 160 with pain, active abduction abduction 90, passive abduction 100,. External Rotation 85. Internal rotation 75. Sensation to light touch median, radial, ulnar nerve. Positive AIN, PIN, ulnar nerve motor function. Positive pulses. 4/5 supraspinatus strength/ 4+ infraspinatus and 5/5 subscapularis. Internal rotation to T-12. Positive impingement. Pain with cross arm adduction negative O'marc.       Procedures    Imaging Results (Most Recent)       None        EMG/NCS, 7/6/2024, impression: Normal study of the left upper extremity.  No active electrophysiologic evidence of a left cervical radiculopathy, left brachial plexopathy, focal neuropathy probably neuropathic changes myopathy or motor nerves pathology at this time    Result Review :   The following data was reviewed by: ARMANI Arango on 08/05/2024:  Data reviewed : Radiologic studies reviewed by me with the patient              Assessment and Plan    Diagnoses and all orders for this visit:    1. Injury of left shoulder, subsequent encounter (Primary)  -     FL Contrast Injection CT / MRI; Future  -     MRI shoulder left arthrogram; Future    2. Osteoarthritis of AC (acromioclavicular) joint  -     FL Contrast Injection CT / MRI; Future  -     MRI shoulder left arthrogram; Future        Discussed diagnosis and treatment options with the patient and her , due to no improvement with conservative treatments and multiple imaging and nerve testing studies with normal findings patient was advised we can order MRI arthrogram of the left shoulder for  further evaluation, she agreed to have this, remain off of work, follow-up after MRI    Call or return if worsening symptoms.    Follow Up   Return for After MRI.  Patient was given instructions and counseling regarding her condition or for health maintenance advice. Please see specific information pulled into the AVS if appropriate.       EMR Dragon/Transcription disclaimer:  Part of this note may be an electronic transcription/translation of spoken language to printed text using the Dragon Dictation System

## 2024-08-07 ENCOUNTER — TREATMENT (OUTPATIENT)
Dept: PHYSICAL THERAPY | Facility: CLINIC | Age: 29
End: 2024-08-07
Payer: OTHER MISCELLANEOUS

## 2024-08-07 DIAGNOSIS — M54.2 CERVICAL PAIN: Primary | ICD-10-CM

## 2024-08-07 DIAGNOSIS — M54.12 RADICULOPATHY, CERVICAL: ICD-10-CM

## 2024-08-07 NOTE — PROGRESS NOTES
Outpatient Physical Therapy                   Physical Therapy Daily Treatment Note    Patient: Zahida Meng   : 1995  Diagnosis/ICD-10 Code:  Cervical pain [M54.2]  Referring practitioner: Edgard Mercado*  Date of Initial Visit: Type: THERAPY  Noted: 2024  Today's Date: 2024  Patient seen for 5 sessions             Subjective   Zahida Meng reports: 7/10 pain at time of arrival. She slept on her left side last night so her pain is increased today. Patient states her elbow hurts near the olecranon and her left shoulder is sore. Patient is having tingling in the left hand.     Objective     See Exercise, Manual, and Modality Logs for complete treatment.     Assessment/Plan  Left hand tingling was increased during rows. Patient was able to take a break to get some relief. Pt would benefit from skilled PT to address Range of Motion  and Strength deficits, pain management and any concerns with ADLs.     Progress per Plan of Care      Timed:  Manual Therapy:    8     mins  95631;  Therapeutic Exercise:    13     mins  89475;     Neuromuscular Darrin:    0    mins  18992;    Therapeutic Activity:     8     mins  51662;     Gait Trainin     mins  42480;    Aquatic Therapy:     0     mins  53957;       Untimed:  Electrical Stimulation:    0     mins  15574 ( );  Mechanical Traction:    0     mins  11197;       Timed Treatment:   29   mins   Total Treatment:     29   mins      Electronically signed:   Mary Edgar PTA  Physical Therapist Assistant  [facility] License #: [id number]

## 2024-08-09 ENCOUNTER — TREATMENT (OUTPATIENT)
Dept: PHYSICAL THERAPY | Facility: CLINIC | Age: 29
End: 2024-08-09
Payer: OTHER MISCELLANEOUS

## 2024-08-09 DIAGNOSIS — M54.12 RADICULOPATHY, CERVICAL: ICD-10-CM

## 2024-08-09 DIAGNOSIS — M54.2 CERVICAL PAIN: Primary | ICD-10-CM

## 2024-08-09 NOTE — PROGRESS NOTES
Progress Note  [facility] [address] [place] [address]      Patient: Zahida Meng   : 1995  Diagnosis/ICD-10 Code:  Cervical pain [M54.2]  Referring practitioner: Edgard Mercado*  Date of Initial Visit: Type: THERAPY  Noted: 2024  Today's Date: 2024  Patient seen for 6 sessions      Subjective:   Subjective Questionnaire: NDI:28/50 = 56% Disability  Clinical Progress: improved  Home Program Compliance: Yes  Treatment has included: therapeutic exercise, neuromuscular re-education, manual therapy, and therapeutic activity    Subjective   The patient reported that her pain level is a 5/10 today. Over the past month, she has noticed some improvement in pain, specifically pain in her left arm. Her pain today is located mainly in her neck and around her left scapula. She still has difficulty looking to the left and raising her left arm overhead.    Objective      Active Range of Motion   Cervical/Thoracic Spine   Cervical     Flexion: 35 degrees   Extension: 45 degrees   Left lateral flexion: 38 (on right) degrees with pain  Right lateral flexion: 39 degrees   Left rotation: 50 degrees with pain, improved to 64 following manual therapy  Right rotation: 73 degrees      Left Shoulder   Flexion: 95 degrees with pain  Abduction: 94 degrees with pain  External rotation BTH: C2 with pain     Passive Range of Motion    Left Shoulder  Flexion: 155 degrees with pain  External rotation: 70 degrees with pain    Palpation    Tenderness to palpation of left upper trapezius, cervical paraspinals, left levator scapulae, left middle trapezius/rhomboids.    See Exercise, Manual, and Modality Logs for complete treatment.     Assessment & Plan       Assessment  Assessment details: The patient was re-evaluated today and presents with improvements in cervical ROM and self rated pain. However, she continues to present with a significant limitation in left shoulder AROM secondary to pain, mild  cervical ROM limitations, and ongoing functional deficits (NDI). She would benefit from continued skilled physical therapy to address remaining functional deficits and to allow the patient to return to her prior level of function.     Goals  Plan Goals: CERVICAL PROBLEMS     1. The patient has limited ROM.                       LTG 1: 12 weeks:  The patient will demonstrate 70° of left cervical spine rotation of in order to adequately monitor blind spots while driving and improve ability to perform activities of daily living.                          STATUS:  Progressing  STG 1a: 6 weeks:  The patient will demonstrate 60° of left cervical spine rotation in order to adequately monitor blind spots while driving and improve ability to perform activities of daily living.                          STATUS: Progressing                2. The patient has complaints of pain.              LTG 2: 12 weeks:  The patient will report 1/10 pain in order to more easily tolerate activities of daily living and improve sleep quality.                          STATUS:  Progressing              STG 2a: 6 weeks:  The patient will report 3/10 pain.                          STATUS:  Progressing     3. The patient reports radicular symptoms in the left upper extremity.              LTG 3: 12 weeks:  The patient will report a decrease in radicular symptoms in the left upper extremity by 90%.                          STATUS:  Progressing              STG 3a: 6 weeks:  The patient will report a decrease in radicular symptoms in the left upper extremity by 50%.                          STATUS:  Met     4. Carrying, Moving, and Handling Objects Functional Limitation                               LTG 4: 12 weeks:  The patient will demonstrate improved function by achieving a score of 5 on the Neck Disability Index.                          STATUS:  Progressing              STG 4a: 6 weeks:  The patient will demonstrate improved function by achieving  a score of 10 on the Neck Disability Index.                            STATUS:  Progressing        Progress toward previous goals: Partially Met      Recommendations: Continue as planned  Timeframe: 1 month  Prognosis to achieve goals: fair    PT Signature: Hari Oden PT    Electronically signed 2024    [place] License: PT - [id number]       Timed:  Manual Therapy:    10     mins  11051;  Therapeutic Exercise:    10     mins  34053;     Neuromuscular Darrin:    0    mins  04567;    Therapeutic Activity:     10     mins  95011;     Gait Trainin     mins  03494;     Aquatics                         0      mins  10164    Un-timed:  Mechanical Traction      0     mins  94339  Dry Needling     0     mins self-pay  Electrical Stimulation:    0     mins  86880 ( );    Timed Treatment:   30   mins   Total Treatment:     30   mins

## 2024-08-12 ENCOUNTER — TREATMENT (OUTPATIENT)
Dept: PHYSICAL THERAPY | Facility: CLINIC | Age: 29
End: 2024-08-12
Payer: OTHER MISCELLANEOUS

## 2024-08-12 DIAGNOSIS — M54.2 CERVICAL PAIN: Primary | ICD-10-CM

## 2024-08-12 DIAGNOSIS — M54.12 RADICULOPATHY, CERVICAL: ICD-10-CM

## 2024-08-12 PROCEDURE — 97530 THERAPEUTIC ACTIVITIES: CPT | Performed by: PHYSICAL THERAPIST

## 2024-08-12 PROCEDURE — 97110 THERAPEUTIC EXERCISES: CPT | Performed by: PHYSICAL THERAPIST

## 2024-08-12 PROCEDURE — 97140 MANUAL THERAPY 1/> REGIONS: CPT | Performed by: PHYSICAL THERAPIST

## 2024-08-12 NOTE — PROGRESS NOTES
Outpatient Physical Therapy                   Physical Therapy Daily Treatment Note    Patient: Zahida Meng   : 1995  Diagnosis/ICD-10 Code:  Cervical pain [M54.2]  Referring practitioner: Edgard Mercado*  Date of Initial Visit: Type: THERAPY  Noted: 2024  Today's Date: 2024  Patient seen for 7 sessions             Subjective   Zahida Meng reports: she fell in her kitchen about an hour ago, it was on straight concrete. She is sore all over from the fall. She caught herself on her right hand, left elbow, and right knee. Patient states her pain is increased because of it.     Pain: 7/10 pain, at time of arrival.     Objective     See Exercise, Manual, and Modality Logs for complete treatment.     Assessment/Plan  Patient states she has been working on her  strength at home; she has trouble gripping the bands and has to readjust often. Patient states her pain was decreased to 5/10 pain at the end of the session.    Progress per Plan of Care      Timed:  Manual Therapy:    8     mins  51816;  Therapeutic Exercise:    10     mins  80976;     Neuromuscular Darrin:    0    mins  76609;    Therapeutic Activity:     9     mins  28655;     Gait Trainin     mins  37401;    Aquatic Therapy:     0     mins  10076;       Untimed:  Electrical Stimulation:    0     mins  67768 ( );  Mechanical Traction:    0     mins  70895;       Timed Treatment:   27   mins   Total Treatment:     27   mins      Electronically signed:   Mary Edgar PTA  Physical Therapist Assistant  [facility] License #: [id number]

## 2024-08-14 ENCOUNTER — TREATMENT (OUTPATIENT)
Dept: PHYSICAL THERAPY | Facility: CLINIC | Age: 29
End: 2024-08-14
Payer: OTHER MISCELLANEOUS

## 2024-08-14 DIAGNOSIS — M54.2 CERVICAL PAIN: Primary | ICD-10-CM

## 2024-08-14 DIAGNOSIS — M54.12 RADICULOPATHY, CERVICAL: ICD-10-CM

## 2024-08-14 NOTE — PROGRESS NOTES
Outpatient Physical Therapy                   Physical Therapy Daily Treatment Note    Patient: Zahida Meng   : 1995  Diagnosis/ICD-10 Code:  Cervical pain [M54.2]  Referring practitioner: Edgard Mercado*  Date of Initial Visit: Type: THERAPY  Noted: 2024  Today's Date: 2024  Patient seen for 8 sessions             Subjective   Zaihda Meng reports: she feels sluggish today.     Pain: 4/10 pain, at time of arrival, very sore today. Patient states her left thumb and index finger are both numb currently.     Objective     See Exercise, Manual, and Modality Logs for complete treatment.     Assessment/Plan  Patient states she is doing wall ABC's at home, she is now able to get her hand over her head when she isn't looking at it and its easier in abduction. Pronation was limited on the left side with prone I's and palms up.     Progress per Plan of Care      Timed:  Manual Therapy:    0     mins  68744;  Therapeutic Exercise:    16     mins  19500;     Neuromuscular Darrin:    0    mins  20478;    Therapeutic Activity:     11     mins  54884;     Gait Trainin     mins  01037;    Aquatic Therapy:     0     mins  21575;       Untimed:  Electrical Stimulation:    0     mins  93939 ( );  Mechanical Traction:    0     mins  84765;       Timed Treatment:   27   mins   Total Treatment:     27   mins      Electronically signed:   Mary Edgar PTA  Physical Therapist Assistant  [facility] License #: [id number]

## 2024-08-16 DIAGNOSIS — S49.92XD INJURY OF LEFT SHOULDER, SUBSEQUENT ENCOUNTER: Primary | ICD-10-CM

## 2024-08-16 DIAGNOSIS — M19.019 OSTEOARTHRITIS OF AC (ACROMIOCLAVICULAR) JOINT: ICD-10-CM

## 2024-08-16 DIAGNOSIS — M75.82 ROTATOR CUFF TENDONITIS, LEFT: ICD-10-CM

## 2024-08-22 ENCOUNTER — TELEPHONE (OUTPATIENT)
Dept: ORTHOPEDIC SURGERY | Facility: CLINIC | Age: 29
End: 2024-08-22

## 2024-09-16 ENCOUNTER — HOSPITAL ENCOUNTER (OUTPATIENT)
Dept: MRI IMAGING | Facility: HOSPITAL | Age: 29
Discharge: HOME | End: 2024-09-16
Payer: OTHER MISCELLANEOUS

## 2024-09-16 ENCOUNTER — HOSPITAL ENCOUNTER (OUTPATIENT)
Dept: INTERVENTIONAL RADIOLOGY/VASCULAR | Facility: HOSPITAL | Age: 29
Discharge: HOME | End: 2024-09-16
Payer: OTHER MISCELLANEOUS

## 2024-09-16 DIAGNOSIS — S49.92XD INJURY OF LEFT SHOULDER, SUBSEQUENT ENCOUNTER: ICD-10-CM

## 2024-09-16 DIAGNOSIS — M19.019 OSTEOARTHRITIS OF AC (ACROMIOCLAVICULAR) JOINT: ICD-10-CM

## 2024-09-16 PROCEDURE — 77002 NEEDLE LOCALIZATION BY XRAY: CPT

## 2024-09-16 PROCEDURE — 0 GADOBENATE DIMEGLUMINE 529 MG/ML SOLUTION: Performed by: PHYSICIAN ASSISTANT

## 2024-09-16 PROCEDURE — A9577 INJ MULTIHANCE: HCPCS | Performed by: PHYSICIAN ASSISTANT

## 2024-09-16 PROCEDURE — 25510000001 IOPAMIDOL 61 % SOLUTION: Performed by: PHYSICIAN ASSISTANT

## 2024-09-16 PROCEDURE — 73222 MRI JOINT UPR EXTREM W/DYE: CPT

## 2024-09-16 RX ORDER — LIDOCAINE HYDROCHLORIDE 20 MG/ML
20 INJECTION, SOLUTION INFILTRATION; PERINEURAL ONCE
Status: COMPLETED | OUTPATIENT
Start: 2024-09-16 | End: 2024-09-16

## 2024-09-16 RX ORDER — SODIUM CHLORIDE 9 MG/ML
10 INJECTION, SOLUTION INTRAMUSCULAR; INTRAVENOUS; SUBCUTANEOUS AS NEEDED
Status: DISCONTINUED | OUTPATIENT
Start: 2024-09-16 | End: 2024-09-17 | Stop reason: HOSPADM

## 2024-09-16 RX ORDER — IOPAMIDOL 612 MG/ML
15 INJECTION, SOLUTION INTRATHECAL
Status: COMPLETED | OUTPATIENT
Start: 2024-09-16 | End: 2024-09-16

## 2024-09-16 RX ADMIN — IOPAMIDOL 9 ML: 612 INJECTION, SOLUTION INTRATHECAL at 09:56

## 2024-09-16 RX ADMIN — SODIUM BICARBONATE 1 ML: 84 INJECTION, SOLUTION INTRAVENOUS at 09:55

## 2024-09-16 RX ADMIN — GADOBENATE DIMEGLUMINE 0.1 ML: 529 INJECTION, SOLUTION INTRAVENOUS at 09:56

## 2024-09-16 RX ADMIN — LIDOCAINE HYDROCHLORIDE 13 ML: 20 INJECTION, SOLUTION INFILTRATION; PERINEURAL at 09:55

## 2024-09-23 ENCOUNTER — OFFICE VISIT (OUTPATIENT)
Dept: ORTHOPEDIC SURGERY | Facility: CLINIC | Age: 29
End: 2024-09-23
Payer: OTHER MISCELLANEOUS

## 2024-09-23 VITALS
OXYGEN SATURATION: 96 % | DIASTOLIC BLOOD PRESSURE: 90 MMHG | BODY MASS INDEX: 48.82 KG/M2 | HEIGHT: 65 IN | HEART RATE: 62 BPM | SYSTOLIC BLOOD PRESSURE: 124 MMHG | WEIGHT: 293 LBS

## 2024-09-23 DIAGNOSIS — S49.92XD INJURY OF LEFT SHOULDER, SUBSEQUENT ENCOUNTER: Primary | ICD-10-CM

## 2024-09-23 PROCEDURE — 99213 OFFICE O/P EST LOW 20 MIN: CPT | Performed by: PHYSICIAN ASSISTANT

## 2024-09-23 RX ORDER — LACOSAMIDE 100 MG/1
100 TABLET ORAL EVERY 12 HOURS SCHEDULED
Qty: 60 TABLET | Refills: 5 | OUTPATIENT
Start: 2024-09-23

## 2024-10-01 ENCOUNTER — TELEPHONE (OUTPATIENT)
Dept: ORTHOPEDIC SURGERY | Facility: CLINIC | Age: 29
End: 2024-10-01

## 2024-10-01 NOTE — TELEPHONE ENCOUNTER
The [facility] received a fax that requires your attention. The document has been indexed to the patient’s chart for your review.      Reason for sending: RECEIVED RECORDS REQUEST FROM THE [employer]    Documents Description: RECORDS REQUEST-[facility]-9/24/2024    Name of Sender: EDUARDO ARI    Date Indexed: 10/1/2024

## 2024-12-14 ENCOUNTER — HOSPITAL ENCOUNTER (EMERGENCY)
Facility: HOSPITAL | Age: 29
Discharge: HOME | End: 2024-12-14
Attending: EMERGENCY MEDICINE
Payer: COMMERCIAL

## 2024-12-14 VITALS
HEIGHT: 65 IN | HEART RATE: 76 BPM | BODY MASS INDEX: 48.82 KG/M2 | WEIGHT: 293 LBS | OXYGEN SATURATION: 100 % | DIASTOLIC BLOOD PRESSURE: 101 MMHG | TEMPERATURE: 98.7 F | SYSTOLIC BLOOD PRESSURE: 145 MMHG | RESPIRATION RATE: 17 BRPM

## 2024-12-14 DIAGNOSIS — N94.6 MENSTRUAL CRAMPS: Primary | ICD-10-CM

## 2024-12-14 LAB
BASOPHILS # BLD AUTO: 0.07 10*3/MM3 (ref 0–0.2)
BASOPHILS NFR BLD AUTO: 0.8 % (ref 0–1.5)
DEPRECATED RDW RBC AUTO: 40.9 FL (ref 37–54)
EOSINOPHIL # BLD AUTO: 0.12 10*3/MM3 (ref 0–0.4)
EOSINOPHIL NFR BLD AUTO: 1.4 % (ref 0.3–6.2)
ERYTHROCYTE [DISTWIDTH] IN BLOOD BY AUTOMATED COUNT: 12.7 % (ref 12.3–15.4)
HCG INTACT+B SERPL-ACNC: <0.5 MIU/ML
HCT VFR BLD AUTO: 40.6 % (ref 34–46.6)
HGB BLD-MCNC: 13.5 G/DL (ref 12–15.9)
HOLD SPECIMEN: NORMAL
HOLD SPECIMEN: NORMAL
IMM GRANULOCYTES # BLD AUTO: 0.01 10*3/MM3 (ref 0–0.05)
IMM GRANULOCYTES NFR BLD AUTO: 0.1 % (ref 0–0.5)
LYMPHOCYTES # BLD AUTO: 2.04 10*3/MM3 (ref 0.7–3.1)
LYMPHOCYTES NFR BLD AUTO: 23 % (ref 19.6–45.3)
MCH RBC QN AUTO: 29.2 PG (ref 26.6–33)
MCHC RBC AUTO-ENTMCNC: 33.3 G/DL (ref 31.5–35.7)
MCV RBC AUTO: 87.9 FL (ref 79–97)
MONOCYTES # BLD AUTO: 0.55 10*3/MM3 (ref 0.1–0.9)
MONOCYTES NFR BLD AUTO: 6.2 % (ref 5–12)
NEUTROPHILS NFR BLD AUTO: 6.09 10*3/MM3 (ref 1.7–7)
NEUTROPHILS NFR BLD AUTO: 68.5 % (ref 42.7–76)
NRBC BLD AUTO-RTO: 0 /100 WBC (ref 0–0.2)
PLATELET # BLD AUTO: 346 10*3/MM3 (ref 140–450)
PMV BLD AUTO: 10.1 FL (ref 6–12)
RBC # BLD AUTO: 4.62 10*6/MM3 (ref 3.77–5.28)
WBC NRBC COR # BLD AUTO: 8.88 10*3/MM3 (ref 3.4–10.8)
WHOLE BLOOD HOLD COAG: NORMAL
WHOLE BLOOD HOLD SPECIMEN: NORMAL

## 2024-12-14 PROCEDURE — 84702 CHORIONIC GONADOTROPIN TEST: CPT | Performed by: EMERGENCY MEDICINE

## 2024-12-14 PROCEDURE — 85025 COMPLETE CBC W/AUTO DIFF WBC: CPT | Performed by: EMERGENCY MEDICINE

## 2024-12-14 PROCEDURE — 99283 EMERGENCY DEPT VISIT LOW MDM: CPT

## 2024-12-14 RX ORDER — ACETAMINOPHEN 500 MG
1000 TABLET ORAL ONCE
Status: COMPLETED | OUTPATIENT
Start: 2024-12-14 | End: 2024-12-14

## 2024-12-14 RX ORDER — SODIUM CHLORIDE 0.9 % (FLUSH) 0.9 %
10 SYRINGE (ML) INJECTION AS NEEDED
Status: DISCONTINUED | OUTPATIENT
Start: 2024-12-14 | End: 2024-12-14 | Stop reason: HOSPADM

## 2024-12-14 RX ADMIN — ACETAMINOPHEN 1000 MG: 500 TABLET ORAL at 15:49

## 2024-12-14 NOTE — ED PROVIDER NOTES
Time: 2:22 PM EST  Date of encounter:  2024  Independent Historian/Clinical History and Information was obtained by:   Patient    History is limited by: N/A    Chief Complaint   Patient presents with    Vaginal Bleeding         History of Present Illness:  Patient is a 29 y.o. year old female who presents to the emergency department for evaluation of vaginal bleeding.  Patient states she has gone through a super plus tampon hourly since 5 AM this morning.  Patient states she could be pregnant but is unsure.  She is also complaining pelvic and vaginal pain.  Denies fever.      Patient Care Team  Primary Care Provider: Mari Contreras APRN    Past Medical History:     Allergies   Allergen Reactions    Amoxicillin Hives    Augmentin [Amoxicillin-Pot Clavulanate] Hives    Doxycycline Rash    Keflex [Cephalexin] Rash    Penicillins Rash     Past Medical History:   Diagnosis Date    Anxiety     Bronchitis     chronic    Chronic bronchitis     CTS (carpal tunnel syndrome)     Depression     Difficulty walking     Head injury Years ago    Headache, tension-type     HL (hearing loss)     Memory loss Short term memory loss    Migraine     Movement disorder     Multiple personality     Peripheral neuropathy     Seizures Months old    Vision loss      Past Surgical History:   Procedure Laterality Date     SECTION      GALLBLADDER SURGERY      UMBILICAL HERNIA REPAIR       Family History   Problem Relation Age of Onset    Neuropathy Mother     Migraines Father     Stroke Maternal Grandmother        Home Medications:  Prior to Admission medications    Medication Sig Start Date End Date Taking? Authorizing Provider   ibuprofen (ADVIL,MOTRIN) 200 MG tablet Take 1 tablet by mouth Every 6 (Six) Hours As Needed for Mild Pain.  Patient not taking: Reported on 2024    Provider, Historical, MD        Social History:   Social History     Tobacco Use    Smoking status: Former     Current packs/day: 0.25      "Average packs/day: 0.3 packs/day for 8.0 years (2.0 ttl pk-yrs)     Types: Cigarettes, Electronic Cigarette    Smokeless tobacco: Never   Vaping Use    Vaping status: Every Day    Substances: Nicotine, Flavoring    Devices: Disposable   Substance Use Topics    Alcohol use: Not Currently    Drug use: Yes     Frequency: 7.0 times per week     Types: Marijuana         Review of Systems:  Review of Systems   Constitutional: Negative.    HENT: Negative.     Eyes: Negative.    Respiratory: Negative.     Cardiovascular: Negative.    Gastrointestinal: Negative.    Endocrine: Negative.    Genitourinary:  Positive for vaginal bleeding and vaginal pain.   Musculoskeletal: Negative.    Skin: Negative.    Allergic/Immunologic: Negative.    Neurological: Negative.    Hematological: Negative.    Psychiatric/Behavioral: Negative.          Physical Exam:  /97   Pulse 82   Temp 98.7 °F (37.1 °C) (Oral)   Resp 17   Ht 165.1 cm (65\")   Wt (!) 150 kg (330 lb 7.5 oz)   LMP 11/15/2024 (Exact Date)   SpO2 100%   BMI 54.99 kg/m²         Physical Exam  Vitals and nursing note reviewed.   Constitutional:       General: She is not in acute distress.     Appearance: Normal appearance. She is not toxic-appearing.   HENT:      Head: Normocephalic and atraumatic.      Jaw: There is normal jaw occlusion.      Mouth/Throat:      Mouth: Mucous membranes are moist.      Pharynx: Oropharynx is clear.   Eyes:      General: Lids are normal.      Extraocular Movements: Extraocular movements intact.      Conjunctiva/sclera: Conjunctivae normal.      Pupils: Pupils are equal, round, and reactive to light.   Cardiovascular:      Rate and Rhythm: Normal rate and regular rhythm.      Pulses: Normal pulses.      Heart sounds: Normal heart sounds.   Pulmonary:      Effort: Pulmonary effort is normal. No respiratory distress.      Breath sounds: Normal breath sounds. No stridor. No wheezing, rhonchi or rales.   Abdominal:      General: Abdomen is " flat. Bowel sounds are normal.      Palpations: Abdomen is soft.      Tenderness: There is generalized abdominal tenderness. There is no right CVA tenderness, left CVA tenderness, guarding or rebound.   Musculoskeletal:         General: Normal range of motion.      Cervical back: Normal range of motion and neck supple.      Right lower leg: No edema.      Left lower leg: No edema.   Skin:     General: Skin is warm and dry.   Neurological:      Mental Status: She is alert and oriented to person, place, and time. Mental status is at baseline.   Psychiatric:         Mood and Affect: Mood normal.                       Medical Decision Making:      Comorbidities that affect care:    Anxiety, peripheral neuropathy, depression, multiple personalities, movement disorder, seizure    External Notes reviewed:    None      The following orders were placed and all results were independently analyzed by me:  Orders Placed This Encounter   Procedures    Minter Draw    hCG, Quantitative, Pregnancy    CBC Auto Differential    NPO Diet NPO Type: Strict NPO    Undress & Gown    Vital Signs    Orthostatic Blood Pressure    Supplies To Bedside - Notify MD When Ready- Pelvic cart / set up    Oxygen Therapy- Nasal Cannula; Titrate 1-6 LPM Per SpO2; 90 - 95%    Insert Peripheral IV    CBC & Differential    Green Top (Gel)    Lavender Top    Gold Top - SST    Light Blue Top       Medications Given in the Emergency Department:  Medications   sodium chloride 0.9 % flush 10 mL (has no administration in time range)   acetaminophen (TYLENOL) tablet 1,000 mg (1,000 mg Oral Given 12/14/24 7969)        ED Course:    The patient was initially evaluated in the triage area where orders were placed. The patient was later dispositioned by ANTHONY Bajwa.      The patient was advised to stay for completion of workup which includes but is not limited to communication of labs and radiological results, reassessment and plan. The patient was advised  that leaving prior to disposition by a provider could result in critical findings that are not communicated to the patient.          Labs:    Lab Results (last 24 hours)       Procedure Component Value Units Date/Time    CBC & Differential [[id number]]  (Normal) Collected: 12/14/24 1514    Specimen: Blood Updated: 12/14/24 1522    Narrative:      The following orders were created for panel order CBC & Differential.  Procedure                               Abnormality         Status                     ---------                               -----------         ------                     CBC Auto Differential[[id number]]        Normal              Final result                 Please view results for these tests on the individual orders.    hCG, Quantitative, Pregnancy [[id number]] Collected: 12/14/24 1514    Specimen: Blood Updated: 12/14/24 1538     HCG Quantitative <0.50 mIU/mL     Narrative:      HCG Ranges by Gestational Age    Females - non-pregnant premenopausal   </= 1mIU/mL HCG  Females - postmenopausal               </= 7mIU/mL HCG    3 Weeks       5.4   -      72 mIU/mL  4 Weeks      10.2   -     708 mIU/mL  5 Weeks       217   -   8,245 mIU/mL  6 Weeks       152   -  32,177 mIU/mL  7 Weeks     4,059   - 153,767 mIU/mL  8 Weeks    31,366   - 149,094 mIU/mL  9 Weeks    59,109   - 135,901 mIU/mL  10 Weeks   44,186   - 170,409 mIU/mL  12 Weeks   27,107   - 201,615 mIU/mL  14 Weeks   24,302   -  93,646 mIU/mL  15 Weeks   12,540   -  69,747 mIU/mL  16 Weeks    8,904   -  55,332 mIU/mL  17 Weeks    8,240   -  51,793 mIU/mL  18 Weeks    9,649   -  55,271 mIU/mL      CBC Auto Differential [[id number]]  (Normal) Collected: 12/14/24 1514    Specimen: Blood Updated: 12/14/24 1522     WBC 8.88 10*3/mm3      RBC 4.62 10*6/mm3      Hemoglobin 13.5 g/dL      Hematocrit 40.6 %      MCV 87.9 fL      MCH 29.2 pg      MCHC 33.3 g/dL      RDW 12.7 %      RDW-SD 40.9 fl      MPV 10.1 fL      Platelets 346 10*3/mm3      Neutrophil %  68.5 %      Lymphocyte % 23.0 %      Monocyte % 6.2 %      Eosinophil % 1.4 %      Basophil % 0.8 %      Immature Grans % 0.1 %      Neutrophils, Absolute 6.09 10*3/mm3      Lymphocytes, Absolute 2.04 10*3/mm3      Monocytes, Absolute 0.55 10*3/mm3      Eosinophils, Absolute 0.12 10*3/mm3      Basophils, Absolute 0.07 10*3/mm3      Immature Grans, Absolute 0.01 10*3/mm3      nRBC 0.0 /100 WBC              Imaging:    No Radiology Exams Resulted Within Past 24 Hours      Differential Diagnosis and Discussion:      Vaginal Bleeding: Differential diagnosis includes but is not limited to foreign body, tumor, vaginitis, dysfunctional uterine bleeding, endocrine abnormalities, coagulation disorder, systemic illness, polyps, complications of pregnancy (possible ectopic pregnancy).    PROCEDURES:    Labs were drawn in the emergency department and all labs were reviewed and interpreted by me.    No orders to display        Procedures    MDM  Number of Diagnoses or Management Options  Menstrual cramps  Diagnosis management comments: The patient presents with vaginal bleeding. Possible etiology of vaginal bleeding were considered, but not limited to; ectopic pregnancy, spontaneous miscarriage, gestational trophoblastic disease, implantation bleeding, molar pregnancy, disfunctional uterine bleeding, and fibroids. The patient is well appearing and resting comfortably. There are no indications for transfusion. After careful consideration the patient is safe and stable to be discharged home with an outpatient OB/GYN follow-up. Patient was counseled to return to the ER or follow-up with their OB/GYN in 48 hours especially for worsening of her bleeding or increased pain. The patient has expressed a clear and thorough understanding and his agreed to follow-up as instructed.  The patient´s CBC that was reviewed and interpreted by me shows no abnormalities of critical concern. Of note, there is no anemia requiring a blood transfusion  and the platelet count is acceptable.  hCG was negative.  Patient verbalized understanding of strict return instructions.       Amount and/or Complexity of Data Reviewed  Clinical lab tests: reviewed and ordered  Review and summarize past medical records: yes  Independent visualization of images, tracings, or specimens: yes        Patient Care Considerations:    CONSULT: I considered consulting OB/GYN, however no emergent indications.      Consultants/Shared Management Plan:    None    Social Determinants of Health:    Patient is independent, reliable, and has access to care.       Disposition and Care Coordination:    Discharged: The patient is suitable and stable for discharge with no need for consideration of admission.    I have explained the patient´s condition, diagnoses and treatment plan based on the information available to me at this time. I have answered questions and addressed any concerns. The patient has a good  understanding of the patient´s diagnosis, condition, and treatment plan as can be expected at this point. The vital signs have been stable. The patient´s condition is stable and appropriate for discharge from the emergency department.      The patient will pursue further outpatient evaluation with the primary care physician or other designated or consulting physician as outlined in the discharge instructions. They are agreeable to this plan of care and follow-up instructions have been explained in detail. The patient has received these instructions in written format and has expressed an understanding of the discharge instructions. The patient is aware that any significant change in condition or worsening of symptoms should prompt an immediate return to this or the closest emergency department or call to 911.    Final diagnoses:   Menstrual cramps        ED Disposition       ED Disposition   Discharge    Condition   Stable    Comment   --               This medical record created using voice  recognition software.             Lima Messer, APRN  12/14/24 2261

## 2024-12-14 NOTE — DISCHARGE INSTRUCTIONS
Home.  Increase your fluid intake.  You may take Tylenol, Motrin, or Midol for pain and cramping.  Make sure you are eating well-balanced meals and getting plenty of rest.  You may apply heat pad to your lower abdomen for pain relief.    Call your primary care provider to schedule an appointment for follow-up as needed.    If symptoms worsen, change in presentation, or you develop new symptoms please seek medical attention or return to the ED for further evaluation.

## 2024-12-25 ENCOUNTER — APPOINTMENT (OUTPATIENT)
Dept: GENERAL RADIOLOGY | Facility: HOSPITAL | Age: 29
End: 2024-12-25
Payer: COMMERCIAL

## 2024-12-25 ENCOUNTER — HOSPITAL ENCOUNTER (EMERGENCY)
Facility: HOSPITAL | Age: 29
Discharge: HOME | End: 2024-12-25
Attending: EMERGENCY MEDICINE | Admitting: EMERGENCY MEDICINE
Payer: COMMERCIAL

## 2024-12-25 VITALS
HEART RATE: 93 BPM | RESPIRATION RATE: 18 BRPM | SYSTOLIC BLOOD PRESSURE: 152 MMHG | TEMPERATURE: 98.7 F | WEIGHT: 293 LBS | HEIGHT: 65 IN | BODY MASS INDEX: 48.82 KG/M2 | OXYGEN SATURATION: 100 % | DIASTOLIC BLOOD PRESSURE: 108 MMHG
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
## 2024-12-25 DIAGNOSIS — S60.221A CONTUSION OF RIGHT HAND, INITIAL ENCOUNTER: Primary | ICD-10-CM

## 2024-12-25 PROCEDURE — 73130 X-RAY EXAM OF HAND: CPT

## 2024-12-25 PROCEDURE — 99283 EMERGENCY DEPT VISIT LOW MDM: CPT

## 2024-12-25 RX ORDER — DICLOFENAC SODIUM 75 MG/1
75 TABLET, DELAYED RELEASE ORAL 2 TIMES DAILY
Qty: 30 TABLET | Refills: 0 | Status: SHIPPED | OUTPATIENT
Start: 2024-12-25 | End: 2025-01-09

## 2024-12-25 RX ORDER — HYDROCODONE BITARTRATE AND ACETAMINOPHEN 5; 325 MG/1; MG/1
1 TABLET ORAL EVERY 6 HOURS PRN
Status: DISCONTINUED | OUTPATIENT
Start: 2024-12-25 | End: 2024-12-25 | Stop reason: HOSPADM

## 2024-12-25 RX ADMIN — HYDROCODONE BITARTRATE AND ACETAMINOPHEN 1 TABLET: 5; 325 TABLET ORAL at 20:33

## 2024-12-26 NOTE — DISCHARGE INSTRUCTIONS
Your x-ray today was not concerning for any fractures or broken bones.  Rest, ice and elevate.  Wear your Ace wrap for stability and comfort.  Take your meds as prescribed.  You may take over-the-counter acetaminophen 975 mg every 4 hours with your medications as needed for pain.  Follow-up with ANTHONY Zimmerman next week for reevaluation and further treatment as necessary and to discuss possible referral over to orthopedics if your symptoms are not improving with rest, time, and medications.

## 2024-12-26 NOTE — ED PROVIDER NOTES
"Time: 7:17 PM EST  Date of encounter:  2024  Independent Historian/Clinical History and Information was obtained by:   Patient    History is limited by: N/A    Chief Complaint: RIGHT HAND INJURY      History of Present Illness:    The patient is a 29 y.o. year old female who presents to the emergency department for evaluation of right hand pain after she states that she fell while getting into her truck.  She states that she fell with her right hand extended and instantly started having pain and states that \"it is locked up and will not move\".  She states that she is injured and broken that hand multiple times but states that she has not seen orthopedic for those injuries previously.  She is able to flex and extend but does report increased pain and refuses to move the thumb due to pain.  She is neurovascular intact.  There is no significant swelling and 1 small nehal size bruise to the palmar side of her thumb.  She denies any other injury.  She states that she is not on any blood thinners and did not hit her head or have any loss of consciousness.      Patient Care Team  Primary Care Provider: Mari Contreras APRN    Past Medical History:     Allergies   Allergen Reactions    Amoxicillin Hives    Augmentin [Amoxicillin-Pot Clavulanate] Hives    Doxycycline Rash    Keflex [Cephalexin] Rash    Penicillins Rash     Past Medical History:   Diagnosis Date    Anxiety     Bronchitis     chronic    Chronic bronchitis     CTS (carpal tunnel syndrome)     Depression     Difficulty walking     Head injury Years ago    Headache, tension-type     HL (hearing loss)     Memory loss Short term memory loss    Migraine     Movement disorder     Multiple personality     Peripheral neuropathy     Seizures Months old    Vision loss      Past Surgical History:   Procedure Laterality Date     SECTION      GALLBLADDER SURGERY      UMBILICAL HERNIA REPAIR       Family History   Problem Relation Age of Onset    " "Neuropathy Mother     Migraines Father     Stroke Maternal Grandmother        Home Medications:  Prior to Admission medications    Medication Sig Start Date End Date Taking? Authorizing Provider   ibuprofen (ADVIL,MOTRIN) 200 MG tablet Take 1 tablet by mouth Every 6 (Six) Hours As Needed for Mild Pain.  Patient not taking: Reported on 6/4/2024    Provider, Historical, MD        Social History:   Social History     Tobacco Use    Smoking status: Former     Current packs/day: 0.25     Average packs/day: 0.3 packs/day for 8.0 years (2.0 ttl pk-yrs)     Types: Cigarettes, Electronic Cigarette    Smokeless tobacco: Never   Vaping Use    Vaping status: Every Day    Substances: Nicotine, Flavoring    Devices: Disposable   Substance Use Topics    Alcohol use: Not Currently    Drug use: Yes     Frequency: 7.0 times per week     Types: Marijuana         Review of Systems:  Review of Systems   Constitutional:  Negative for chills and fever.   HENT:  Negative for congestion, ear pain and sore throat.    Eyes:  Negative for pain.   Respiratory:  Negative for cough, chest tightness and shortness of breath.    Cardiovascular:  Negative for chest pain.   Gastrointestinal:  Negative for abdominal pain, diarrhea, nausea and vomiting.   Genitourinary:  Negative for flank pain and hematuria.   Musculoskeletal:  Negative for back pain, gait problem, joint swelling, neck pain and neck stiffness.   Skin:  Positive for color change. Negative for pallor, rash and wound.   Neurological:  Negative for seizures and headaches.   All other systems reviewed and are negative.       Physical Exam:  BP (!) 152/108   Pulse 93   Temp 98.7 °F (37.1 °C) (Oral)   Resp 18   Ht 165.1 cm (65\")   Wt (!) 150 kg (330 lb)   LMP 11/15/2024 (Exact Date)   SpO2 100%   BMI 54.91 kg/m²     Physical Exam  Vitals and nursing note reviewed.   Constitutional:       General: She is not in acute distress.     Appearance: Normal appearance. She is not ill-appearing " or toxic-appearing.   HENT:      Head: Normocephalic and atraumatic.   Eyes:      General: No scleral icterus.     Conjunctiva/sclera: Conjunctivae normal.      Pupils: Pupils are equal, round, and reactive to light.   Cardiovascular:      Rate and Rhythm: Normal rate and regular rhythm.      Pulses: Normal pulses.   Pulmonary:      Effort: Pulmonary effort is normal. No respiratory distress.   Musculoskeletal:         General: Tenderness and signs of injury present. No swelling or deformity. Normal range of motion.      Cervical back: Normal range of motion and neck supple. No rigidity or tenderness.   Lymphadenopathy:      Cervical: No cervical adenopathy.   Skin:     General: Skin is warm and dry.      Capillary Refill: Capillary refill takes less than 2 seconds.      Findings: Bruising present. No erythema or rash.   Neurological:      General: No focal deficit present.      Mental Status: She is alert and oriented to person, place, and time. Mental status is at baseline.   Psychiatric:         Mood and Affect: Mood normal.         Behavior: Behavior normal.                  Medical Decision Making:      Comorbidities that affect care:    Arthritis, anxiety, head injury, migraines, carpal tunnel syndrome, memory loss, vision loss, peripheral neuropathy, depression, multiple personalities, seizures, headaches, hearing loss, movement disorder, difficulty walking, chronic bronchitis,    External Notes reviewed:    Previous ED Note: Patient was seen previously in the ED on 12/14/2024 for menstrual cramps      The following orders were placed and all results were independently analyzed by me:  Orders Placed This Encounter   Procedures    XR Hand 3+ View Right    Apply ace wrap       Medications Given in the Emergency Department:  Medications   HYDROcodone-acetaminophen (NORCO) 5-325 MG per tablet 1 tablet (1 tablet Oral Given 12/25/24 2033)        ED Course:         Labs:    Lab Results (last 24 hours)       ** No  results found for the last 24 hours. **             Imaging:    XR Hand 3+ View Right    Result Date: 12/25/2024  XR HAND 3+ VW RIGHT Date of Exam: 12/25/2024 7:15 PM EST Indication: Fingers tingling after fall. Arthritis. Comparison: 12/3/2020 Findings: No fracture or dislocation. No bone erosion or destruction. Soft tissues appear normal.     Negative hand. Electronically Signed: Oskar Toribio MD  12/25/2024 7:36 PM EST  Workstation ID: [id number]       Differential Diagnosis and Discussion:    Extremity Pain: Differential diagnosis includes but is not limited to soft tissue sprain, tendonitis, tendon injury, dislocation, fracture, deep vein thrombosis, arterial insufficiency, osteoarthritis, bursitis, and ligamentous damage.  Joint Pain: Differential diagnosis includes but is not limited to polyarticular arthritis, gout, tendinitis, hemarthrosis, septic arthritis, rheumatoid arthritis, bursitis, degenerative joint disease, joint effusion, autoimmune disorder, trauma, and occult neoplasm.    PROCEDURES:    X-ray were performed in the emergency department and all X-ray impressions were independently interpreted by me.    No orders to display       Procedures    MDM  Number of Diagnoses or Management Options  Contusion of right hand, initial encounter: new and requires workup  Fall, initial encounter: minor     Amount and/or Complexity of Data Reviewed  Tests in the radiology section of CPT®: reviewed    Risk of Complications, Morbidity, and/or Mortality  Presenting problems: low  Diagnostic procedures: low  Management options: low    Patient Progress  Patient progress: stable             Patient Care Considerations:    NARCOTICS: I considered prescribing opiate pain medication as an outpatient, however patient did not have injuries requiring narcotic pain medications at home.      Consultants/Shared Management Plan:    None    Social Determinants of Health:    Patient is independent, reliable, and has access to  care.       Disposition and Care Coordination:    Discharged: The patient is suitable and stable for discharge with no need for consideration of admission.    I have explained the patient´s condition, diagnoses and treatment plan based on the information available to me at this time. I have answered questions and addressed any concerns. The patient has a good  understanding of the patient´s diagnosis, condition, and treatment plan as can be expected at this point. The vital signs have been stable. The patient´s condition is stable and appropriate for discharge from the emergency department.      The patient will pursue further outpatient evaluation with the primary care physician or other designated or consulting physician as outlined in the discharge instructions. They are agreeable to this plan of care and follow-up instructions have been explained in detail. The patient has received these instructions in written format and has expressed an understanding of the discharge instructions. The patient is aware that any significant change in condition or worsening of symptoms should prompt an immediate return to this or the closest emergency department or call to 911.  I have explained discharge medications and the need for follow up with the patient/caretakers. This was also printed in the discharge instructions. Patient was discharged with the following medications and follow up:      Medication List        New Prescriptions      diclofenac 75 MG EC tablet  Commonly known as: VOLTAREN  Take 1 tablet by mouth 2 (Two) Times a Day for 30 doses.               Where to Get Your Medications        These medications were sent to [facility]/pharmacy #[id number] - [place] - [address] - [phone]  - [phone]  [address] [place] [address]      Hours: 24-hours Phone: [phone]   diclofenac 75 MG EC tablet      Mari Contreras, APRN  [address] [facility]  [address] [place] [address]  [address]  [phone]    Call   FOR FOLLOW UP       Final diagnoses:   Contusion of right hand, initial encounter   Fall, initial encounter        ED Disposition       ED Disposition   Discharge    Condition   Stable    Comment   --               This medical record created using voice recognition software.             Jayshree Suarez, APRN  12/25/24 6049

## 2024-12-30 LAB
ABO GROUP BLD: NORMAL
ALBUMIN SERPL-MCNC: 4.5 G/DL (ref 3.5–5.2)
ALBUMIN/GLOB SERPL: 1.3 G/DL
ALP SERPL-CCNC: 65 U/L (ref 39–117)
ALT SERPL W P-5'-P-CCNC: 40 U/L (ref 1–33)
ANION GAP SERPL CALCULATED.3IONS-SCNC: 13.6 MMOL/L (ref 5–15)
AST SERPL-CCNC: 28 U/L (ref 1–32)
BASOPHILS # BLD AUTO: 0.08 10*3/MM3 (ref 0–0.2)
BASOPHILS NFR BLD AUTO: 0.6 % (ref 0–1.5)
BILIRUB SERPL-MCNC: 0.2 MG/DL (ref 0–1.2)
BLD GP AB SCN SERPL QL: NEGATIVE
BUN SERPL-MCNC: 13 MG/DL (ref 6–20)
BUN/CREAT SERPL: 23.2 (ref 7–25)
CALCIUM SPEC-SCNC: 9.6 MG/DL (ref 8.6–10.5)
CHLORIDE SERPL-SCNC: 101 MMOL/L (ref 98–107)
CO2 SERPL-SCNC: 22.4 MMOL/L (ref 22–29)
CREAT SERPL-MCNC: 0.56 MG/DL (ref 0.57–1)
DEPRECATED RDW RBC AUTO: 39.6 FL (ref 37–54)
EGFRCR SERPLBLD CKD-EPI 2021: 127.7 ML/MIN/1.73
EOSINOPHIL # BLD AUTO: 0.08 10*3/MM3 (ref 0–0.4)
EOSINOPHIL NFR BLD AUTO: 0.6 % (ref 0.3–6.2)
ERYTHROCYTE [DISTWIDTH] IN BLOOD BY AUTOMATED COUNT: 12.7 % (ref 12.3–15.4)
GLOBULIN UR ELPH-MCNC: 3.5 GM/DL
GLUCOSE SERPL-MCNC: 97 MG/DL (ref 65–99)
HCT VFR BLD AUTO: 43 % (ref 34–46.6)
HGB BLD-MCNC: 14.3 G/DL (ref 12–15.9)
HOLD SPECIMEN: NORMAL
HOLD SPECIMEN: NORMAL
IMM GRANULOCYTES # BLD AUTO: 0.03 10*3/MM3 (ref 0–0.05)
IMM GRANULOCYTES NFR BLD AUTO: 0.2 % (ref 0–0.5)
LYMPHOCYTES # BLD AUTO: 1.85 10*3/MM3 (ref 0.7–3.1)
LYMPHOCYTES NFR BLD AUTO: 14.3 % (ref 19.6–45.3)
MCH RBC QN AUTO: 28.7 PG (ref 26.6–33)
MCHC RBC AUTO-ENTMCNC: 33.3 G/DL (ref 31.5–35.7)
MCV RBC AUTO: 86.3 FL (ref 79–97)
MONOCYTES # BLD AUTO: 0.73 10*3/MM3 (ref 0.1–0.9)
MONOCYTES NFR BLD AUTO: 5.7 % (ref 5–12)
NEUTROPHILS NFR BLD AUTO: 10.13 10*3/MM3 (ref 1.7–7)
NEUTROPHILS NFR BLD AUTO: 78.6 % (ref 42.7–76)
NRBC BLD AUTO-RTO: 0 /100 WBC (ref 0–0.2)
PLATELET # BLD AUTO: 375 10*3/MM3 (ref 140–450)
PMV BLD AUTO: 9.8 FL (ref 6–12)
POTASSIUM SERPL-SCNC: 4.1 MMOL/L (ref 3.5–5.2)
PROT SERPL-MCNC: 8 G/DL (ref 6–8.5)
RBC # BLD AUTO: 4.98 10*6/MM3 (ref 3.77–5.28)
RH BLD: POSITIVE
SODIUM SERPL-SCNC: 137 MMOL/L (ref 136–145)
T&S EXPIRATION DATE: NORMAL
WBC NRBC COR # BLD AUTO: 12.9 10*3/MM3 (ref 3.4–10.8)
WHOLE BLOOD HOLD COAG: NORMAL
WHOLE BLOOD HOLD SPECIMEN: NORMAL

## 2024-12-30 PROCEDURE — 86901 BLOOD TYPING SEROLOGIC RH(D): CPT

## 2024-12-30 PROCEDURE — 86900 BLOOD TYPING SEROLOGIC ABO: CPT

## 2024-12-30 PROCEDURE — 36415 COLL VENOUS BLD VENIPUNCTURE: CPT

## 2024-12-30 PROCEDURE — 85025 COMPLETE CBC W/AUTO DIFF WBC: CPT

## 2024-12-30 PROCEDURE — 86850 RBC ANTIBODY SCREEN: CPT

## 2024-12-30 PROCEDURE — 80053 COMPREHEN METABOLIC PANEL: CPT

## 2024-12-30 PROCEDURE — 99283 EMERGENCY DEPT VISIT LOW MDM: CPT

## 2024-12-30 RX ORDER — SODIUM CHLORIDE 0.9 % (FLUSH) 0.9 %
10 SYRINGE (ML) INJECTION AS NEEDED
Status: DISCONTINUED | OUTPATIENT
Start: 2024-12-30 | End: 2024-12-31 | Stop reason: HOSPADM

## 2024-12-31 ENCOUNTER — HOSPITAL ENCOUNTER (EMERGENCY)
Facility: HOSPITAL | Age: 29
Discharge: HOME | End: 2024-12-31
Attending: EMERGENCY MEDICINE | Admitting: EMERGENCY MEDICINE
Payer: COMMERCIAL

## 2024-12-31 VITALS
BODY MASS INDEX: 48.82 KG/M2 | SYSTOLIC BLOOD PRESSURE: 136 MMHG | HEIGHT: 65 IN | TEMPERATURE: 97.5 F | RESPIRATION RATE: 14 BRPM | OXYGEN SATURATION: 96 % | HEART RATE: 96 BPM | DIASTOLIC BLOOD PRESSURE: 89 MMHG | WEIGHT: 293 LBS

## 2024-12-31 DIAGNOSIS — K92.1 HEMATOCHEZIA: Primary | ICD-10-CM

## 2024-12-31 LAB — HEMOCCULT STL QL IA: POSITIVE

## 2024-12-31 PROCEDURE — 82274 ASSAY TEST FOR BLOOD FECAL: CPT | Performed by: EMERGENCY MEDICINE

## 2024-12-31 NOTE — ED PROVIDER NOTES
Time: 8:42 PM EST  Date of encounter:  2024  Independent Historian/Clinical History and Information was obtained by:   Patient    History is limited by: N/A    Chief Complaint: Rectal bleeding      History of Present Illness:  Patient is a 29 y.o. year old female who presents to the emergency department for evaluation of rectal bleeding that started last night.  Patient states she is now wiping right red blood with clots.  Having rectal pain.  Has a history of hemorrhoids.      Patient Care Team  Primary Care Provider: Mari Contreras APRN    Past Medical History:     Allergies   Allergen Reactions    Amoxicillin Hives    Augmentin [Amoxicillin-Pot Clavulanate] Hives    Doxycycline Rash    Keflex [Cephalexin] Rash    Penicillins Rash     Past Medical History:   Diagnosis Date    Anxiety     Bronchitis     chronic    Chronic bronchitis     CTS (carpal tunnel syndrome)     Depression     Difficulty walking     Head injury Years ago    Headache, tension-type     HL (hearing loss)     Memory loss Short term memory loss    Migraine     Movement disorder     Multiple personality     Peripheral neuropathy     Seizures Months old    Vision loss      Past Surgical History:   Procedure Laterality Date     SECTION      GALLBLADDER SURGERY      UMBILICAL HERNIA REPAIR       Family History   Problem Relation Age of Onset    Neuropathy Mother     Migraines Father     Stroke Maternal Grandmother        Home Medications:  Prior to Admission medications    Medication Sig Start Date End Date Taking? Authorizing Provider   diclofenac (VOLTAREN) 75 MG EC tablet Take 1 tablet by mouth 2 (Two) Times a Day for 30 doses. 24  Jayshree Suarez APRN   ibuprofen (ADVIL,MOTRIN) 200 MG tablet Take 1 tablet by mouth Every 6 (Six) Hours As Needed for Mild Pain.  Patient not taking: Reported on 2024    Provider, Surjit, MD        Social History:   Social History     Tobacco Use    Smoking status: Former  "    Current packs/day: 0.25     Average packs/day: 0.3 packs/day for 8.0 years (2.0 ttl pk-yrs)     Types: Cigarettes, Electronic Cigarette    Smokeless tobacco: Never   Vaping Use    Vaping status: Every Day    Substances: Nicotine, Flavoring    Devices: Disposable   Substance Use Topics    Alcohol use: Not Currently    Drug use: Yes     Frequency: 7.0 times per week     Types: Marijuana         Review of Systems:  Review of Systems   Constitutional: Negative.    HENT: Negative.     Eyes: Negative.    Respiratory: Negative.     Cardiovascular: Negative.    Gastrointestinal:  Positive for anal bleeding, blood in stool and rectal pain. Negative for diarrhea, nausea and vomiting.   Endocrine: Negative.    Genitourinary: Negative.    Musculoskeletal: Negative.    Skin: Negative.    Allergic/Immunologic: Negative.    Neurological: Negative.    Hematological: Negative.    Psychiatric/Behavioral: Negative.          Physical Exam:  /89 (BP Location: Left arm, Patient Position: Lying)   Pulse 96   Temp 97.5 °F (36.4 °C) (Oral)   Resp 14   Ht 165.1 cm (65\")   Wt (!) 142 kg (312 lb 6.3 oz)   LMP 12/14/2024 (Exact Date)   SpO2 96%   BMI 51.98 kg/m²     Physical Exam  Constitutional:       Appearance: She is obese.   HENT:      Head: Normocephalic.      Mouth/Throat:      Mouth: Mucous membranes are moist.   Eyes:      Pupils: Pupils are equal, round, and reactive to light.   Pulmonary:      Effort: Pulmonary effort is normal.   Abdominal:      General: There is no distension.   Genitourinary:     Comments: SERGEI Cornelius at bedside as chaperone    No evidence of external hemorrhoid active bleeding or fissure      Musculoskeletal:      Cervical back: Neck supple.   Skin:     General: Skin is warm and dry.   Neurological:      General: No focal deficit present.      Mental Status: She is alert and oriented to person, place, and time.   Psychiatric:         Mood and Affect: Mood normal.         Behavior: Behavior normal. "              Medical Decision Making:      Comorbidities that affect care:    Anxiety, depression, seizure, migraine, peripheral neuropathy,    External Notes reviewed:    Previous Clinic Note: Outpatient orthopedic visit 9/23/2024      The following orders were placed and all results were independently analyzed by me:  Orders Placed This Encounter   Procedures    Sandy Draw    Comprehensive Metabolic Panel    Occult Blood, Fecal By Immunoassay - Stool, Per Rectum    CBC Auto Differential    Ambulatory Referral to Gastroenterology    Undress & Gown    Vital Signs    Type & Screen    CBC & Differential    Green Top (Gel)    Lavender Top    Gold Top - SST    Light Blue Top       Medications Given in the Emergency Department:  Medications - No data to display       ED Course:    ED Course as of 12/31/24 0705   Mon Dec 30, 2024   2043 --- PROVIDER IN TRIAGE NOTE ---    The patient was evaluated by me, Dorothy Carpio in triage. Orders were placed and the patient is currently awaiting disposition.    [AJ]      ED Course User Index  [AJ] Dorothy Carpio PA-C       Labs:    Lab Results (last 24 hours)       Procedure Component Value Units Date/Time    CBC & Differential [[id number]]  (Abnormal) Collected: 12/30/24 2050    Specimen: Blood from Hand, Right Updated: 12/30/24 2101    Narrative:      The following orders were created for panel order CBC & Differential.  Procedure                               Abnormality         Status                     ---------                               -----------         ------                     CBC Auto Differential[[id number]]        Abnormal            Final result                 Please view results for these tests on the individual orders.    Comprehensive Metabolic Panel [[id number]]  (Abnormal) Collected: 12/30/24 2050    Specimen: Blood from Hand, Right Updated: 12/30/24 2123     Glucose 97 mg/dL      BUN 13 mg/dL      Creatinine 0.56 mg/dL      Sodium 137 mmol/L       Potassium 4.1 mmol/L      Chloride 101 mmol/L      CO2 22.4 mmol/L      Calcium 9.6 mg/dL      Total Protein 8.0 g/dL      Albumin 4.5 g/dL      ALT (SGPT) 40 U/L      AST (SGOT) 28 U/L      Alkaline Phosphatase 65 U/L      Total Bilirubin 0.2 mg/dL      Globulin 3.5 gm/dL      A/G Ratio 1.3 g/dL      BUN/Creatinine Ratio 23.2     Anion Gap 13.6 mmol/L      eGFR 127.7 mL/min/1.73     Narrative:      GFR Categories in Chronic Kidney Disease (CKD)      GFR Category          GFR (mL/min/1.73)    Interpretation  G1                     90 or greater         Normal or high (1)  G2                      60-89                Mild decrease (1)  G3a                   45-59                Mild to moderate decrease  G3b                   30-44                Moderate to severe decrease  G4                    15-29                Severe decrease  G5                    14 or less           Kidney failure          (1)In the absence of evidence of kidney disease, neither GFR category G1 or G2 fulfill the criteria for CKD.    eGFR calculation 2021 CKD-EPI creatinine equation, which does not include race as a factor    CBC Auto Differential [[id number]]  (Abnormal) Collected: 12/30/24 2050    Specimen: Blood from Hand, Right Updated: 12/30/24 2101     WBC 12.90 10*3/mm3      RBC 4.98 10*6/mm3      Hemoglobin 14.3 g/dL      Hematocrit 43.0 %      MCV 86.3 fL      MCH 28.7 pg      MCHC 33.3 g/dL      RDW 12.7 %      RDW-SD 39.6 fl      MPV 9.8 fL      Platelets 375 10*3/mm3      Neutrophil % 78.6 %      Lymphocyte % 14.3 %      Monocyte % 5.7 %      Eosinophil % 0.6 %      Basophil % 0.6 %      Immature Grans % 0.2 %      Neutrophils, Absolute 10.13 10*3/mm3      Lymphocytes, Absolute 1.85 10*3/mm3      Monocytes, Absolute 0.73 10*3/mm3      Eosinophils, Absolute 0.08 10*3/mm3      Basophils, Absolute 0.08 10*3/mm3      Immature Grans, Absolute 0.03 10*3/mm3      nRBC 0.0 /100 WBC     Occult Blood, Fecal By Immunoassay - Stool, Per  Rectum [[id number]]  (Abnormal) Collected: 12/31/24 0126    Specimen: Stool from Per Rectum Updated: 12/31/24 0137     Occult Blood, Fecal by Immunoassay Positive             Imaging:    No Radiology Exams Resulted Within Past 24 Hours      Differential Diagnosis and Discussion:    GI Bleeding: Differential diagnosis includes but is not limited to gastritis, gastric ulcer, stress ulcer, duodenitis, Narcisa-Guajardo tears, esophageal varices, angiodysplasia, aortic enteric fistula, hematologic issues including thrombocytopenia, GI neoplasm, ulcerative colitis, Crohn's disease, diverticulosis, diverticulitis, hemorrhoids, aortic aneurysm, and polyps    PROCEDURES:    Labs were collected in the emergency department and all labs were reviewed and interpreted by me.    No orders to display       Procedures    MDM                     Patient Care Considerations:    NARCOTICS: I considered prescribing opiate pain medication as an outpatient, however no pain control required in the ED.      Consultants/Shared Management Plan:    None    Social Determinants of Health:    Patient has presented with family members who are responsible, reliable and will ensure follow up care.      Disposition and Care Coordination:    Discharged: I considered escalation of care by admitting this patient to the hospital, however patient's hemodynamically stable with normal H&H no evidence of active bleeding.    I have explained the patient´s condition, diagnoses and treatment plan based on the information available to me at this time. I have answered questions and addressed any concerns. The patient has a good  understanding of the patient´s diagnosis, condition, and treatment plan as can be expected at this point. The vital signs have been stable. The patient´s condition is stable and appropriate for discharge from the emergency department.      The patient will pursue further outpatient evaluation with the primary care physician or other  designated or consulting physician as outlined in the discharge instructions. They are agreeable to this plan of care and follow-up instructions have been explained in detail. The patient has received these instructions in written format and has expressed an understanding of the discharge instructions. The patient is aware that any significant change in condition or worsening of symptoms should prompt an immediate return to this or the closest emergency department or call to 911.  I have explained discharge medications and the need for follow up with the patient/caretakers. This was also printed in the discharge instructions. Patient was discharged with the following medications and follow up:      Medication List      No changes were made to your prescriptions during this visit.      Mari Contreras, APRN  [address] [facility]  [address] [place] [id number] [phone]    Schedule an appointment as soon as possible for a visit          Final diagnoses:   Hematochezia        ED Disposition       ED Disposition   Discharge    Condition   Stable    Comment   --               This medical record created using voice recognition software.             Valdo Erickson MD  12/31/24 0705

## 2025-01-21 ENCOUNTER — PREP FOR SURGERY (OUTPATIENT)
Dept: OTHER | Facility: HOSPITAL | Age: 30
End: 2025-01-21
Payer: COMMERCIAL

## 2025-01-21 ENCOUNTER — CLINICAL SUPPORT (OUTPATIENT)
Dept: GASTROENTEROLOGY | Facility: CLINIC | Age: 30
End: 2025-01-21
Payer: COMMERCIAL

## 2025-01-21 DIAGNOSIS — R19.5 POSITIVE COLORECTAL CANCER SCREENING USING COLOGUARD TEST: Primary | ICD-10-CM

## 2025-01-21 RX ORDER — VENLAFAXINE HYDROCHLORIDE 75 MG/1
75 CAPSULE, EXTENDED RELEASE ORAL DAILY
COMMUNITY
Start: 2024-12-04

## 2025-01-21 RX ORDER — LAMOTRIGINE 200 MG/1
1 TABLET ORAL EVERY 12 HOURS SCHEDULED
COMMUNITY
Start: 2024-11-28

## 2025-01-21 RX ORDER — POLYETHYLENE GLYCOL 3350, SODIUM SULFATE ANHYDROUS, SODIUM BICARBONATE, SODIUM CHLORIDE, POTASSIUM CHLORIDE 236; 22.74; 6.74; 5.86; 2.97 G/4L; G/4L; G/4L; G/4L; G/4L
4 POWDER, FOR SOLUTION ORAL ONCE
Qty: 4000 ML | Refills: 0 | Status: SHIPPED | OUTPATIENT
Start: 2025-01-21 | End: 2025-01-21

## 2025-01-21 RX ORDER — ARIPIPRAZOLE 5 MG/1
5 TABLET ORAL
COMMUNITY
Start: 2024-11-27

## 2025-01-21 NOTE — PROGRESS NOTES
Zahida Meng  1995  29 y.o.    Reason for call: Positive Cologuard  Prep prescribed: Ji has seizures and history of constipation   Prep instructions reviewed with patient and sent to patient via [facility]  Is the patient currently on any injectable or oral medications for weight loss or diabetes? No  Clearance needed? No  If yes, what clearance is needed? N/A  Clearance has been requested from na  The patient has been scheduled for: Colonoscopy        Zahida Meng is aware they have been scheduled for a diagnostic colonoscopy, because the referral was sent over for positive colo guard..    After your procedure, you will be contacted with results. Please confirm the best phone # to reach the patient: [phone]  Family history of colon cancer? No  If yes, indicate relative: na  Tentative Procedure Date: 2025          Family History   Problem Relation Age of Onset    Neuropathy Mother     Migraines Father     Stroke Maternal Grandmother     Colon cancer Neg Hx      Past Medical History:   Diagnosis Date    Anxiety     Bronchitis     chronic    Chronic bronchitis     CTS (carpal tunnel syndrome)     Depression     Difficulty walking     Head injury Years ago    Headache, tension-type     HL (hearing loss)     Memory loss Short term memory loss    Migraine     Movement disorder     Multiple personality     Peripheral neuropathy     Seizures Months old    Vision loss      Allergies   Allergen Reactions    Amoxicillin Hives    Augmentin [Amoxicillin-Pot Clavulanate] Hives    Doxycycline Rash    Keflex [Cephalexin] Rash    Penicillins Rash     Past Surgical History:   Procedure Laterality Date     SECTION      GALLBLADDER SURGERY      UMBILICAL HERNIA REPAIR       Social History     Socioeconomic History    Marital status: Single   Tobacco Use    Smoking status: Every Day     Current packs/day: 0.25     Average packs/day: 0.3 packs/day for 8.0 years (2.0 ttl pk-yrs)     Types:  Cigarettes, Electronic Cigarette    Smokeless tobacco: Never   Vaping Use    Vaping status: Every Day    Substances: Nicotine, Flavoring    Devices: Disposable, Refillable tank, Pre-filled pod   Substance and Sexual Activity    Alcohol use: Not Currently    Drug use: Yes     Frequency: 7.0 times per week     Types: Marijuana    Sexual activity: Yes     Partners: Male     Birth control/protection: None       Current Outpatient Medications:     lamoTRIgine (LaMICtal) 200 MG tablet, Take 1 tablet by mouth Every 12 (Twelve) Hours., Disp: , Rfl:     venlafaxine XR (Effexor XR) 75 MG 24 hr capsule, Take 1 capsule by mouth Daily., Disp: , Rfl:     ARIPiprazole (ABILIFY) 5 MG tablet, Take 1 tablet by mouth every night at bedtime. (Patient not taking: Reported on 1/21/2025), Disp: , Rfl:     ibuprofen (ADVIL,MOTRIN) 200 MG tablet, Take 1 tablet by mouth Every 6 (Six) Hours As Needed for Mild Pain. (Patient not taking: Reported on 1/21/2025), Disp: , Rfl:

## 2025-02-17 ENCOUNTER — OFFICE VISIT (OUTPATIENT)
Dept: OBSTETRICS AND GYNECOLOGY | Facility: CLINIC | Age: 30
End: 2025-02-17
Payer: COMMERCIAL

## 2025-02-17 ENCOUNTER — PATIENT ROUNDING (BHMG ONLY) (OUTPATIENT)
Dept: OBSTETRICS AND GYNECOLOGY | Facility: CLINIC | Age: 30
End: 2025-02-17

## 2025-02-17 VITALS
HEIGHT: 65 IN | SYSTOLIC BLOOD PRESSURE: 106 MMHG | HEART RATE: 70 BPM | BODY MASS INDEX: 48.82 KG/M2 | DIASTOLIC BLOOD PRESSURE: 67 MMHG | WEIGHT: 293 LBS

## 2025-02-17 DIAGNOSIS — N92.1 MENORRHAGIA WITH IRREGULAR CYCLE: Primary | ICD-10-CM

## 2025-02-17 LAB
DEPRECATED RDW RBC AUTO: 37.4 FL (ref 37–54)
ERYTHROCYTE [DISTWIDTH] IN BLOOD BY AUTOMATED COUNT: 11.7 % (ref 12.3–15.4)
FSH SERPL-ACNC: 8.82 MIU/ML
HCT VFR BLD AUTO: 42.2 % (ref 34–46.6)
HGB BLD-MCNC: 14.5 G/DL (ref 12–15.9)
MCH RBC QN AUTO: 29.8 PG (ref 26.6–33)
MCHC RBC AUTO-ENTMCNC: 34.4 G/DL (ref 31.5–35.7)
MCV RBC AUTO: 86.8 FL (ref 79–97)
PLATELET # BLD AUTO: 353 10*3/MM3 (ref 140–450)
PMV BLD AUTO: 10.5 FL (ref 6–12)
PROLACTIN SERPL-MCNC: 19.4 NG/ML (ref 4.79–23.3)
RBC # BLD AUTO: 4.86 10*6/MM3 (ref 3.77–5.28)
T4 FREE SERPL-MCNC: 1.04 NG/DL (ref 0.92–1.68)
TSH SERPL DL<=0.05 MIU/L-ACNC: 2.09 UIU/ML (ref 0.27–4.2)
WBC NRBC COR # BLD AUTO: 8.48 10*3/MM3 (ref 3.4–10.8)

## 2025-02-17 PROCEDURE — 84146 ASSAY OF PROLACTIN: CPT | Performed by: OBSTETRICS & GYNECOLOGY

## 2025-02-17 PROCEDURE — 84439 ASSAY OF FREE THYROXINE: CPT | Performed by: OBSTETRICS & GYNECOLOGY

## 2025-02-17 PROCEDURE — 83001 ASSAY OF GONADOTROPIN (FSH): CPT | Performed by: OBSTETRICS & GYNECOLOGY

## 2025-02-17 PROCEDURE — 85027 COMPLETE CBC AUTOMATED: CPT | Performed by: OBSTETRICS & GYNECOLOGY

## 2025-02-17 PROCEDURE — G0123 SCREEN CERV/VAG THIN LAYER: HCPCS | Performed by: OBSTETRICS & GYNECOLOGY

## 2025-02-17 PROCEDURE — 84443 ASSAY THYROID STIM HORMONE: CPT | Performed by: OBSTETRICS & GYNECOLOGY

## 2025-02-17 NOTE — PROGRESS NOTES
A My-Chart message has been sent to the patient [place] [facility]

## 2025-02-17 NOTE — PROGRESS NOTES
"GYN Problem/Follow Up Visit    Chief Complaint   Patient presents with    Gynecologic Exam           HPI  Zahida Meng is a 29 y.o. female, , who presents for irreg heavy menses. States stopped depo two years ago and menses have been really heavy and irreg since. Will sometimes skip menses. Changes products q 30 min at times. Occ mild cramping. No hx pcos but was told she may have endometriosis years ago. Desires pregnancy.        Additional OB/GYN History   Patient's last menstrual period was 2025.  Current contraception: contraceptive methods: None  Desires to: do not start contraception  Allergies : Amoxicillin, Augmentin [amoxicillin-pot clavulanate], Doxycycline, Keflex [cephalexin], and Penicillins     The additional following portions of the patient's history were reviewed and updated as appropriate: allergies, current medications, past family history, past medical history, past social history, past surgical history, and problem list.    Review of Systems    I have reviewed and agree with the HPI, ROS, and historical information as entered above. Linda Ramirez, APRN    Objective   /67   Pulse 70   Ht 165.1 cm (65\")   Wt (!) 142 kg (312 lb)   LMP 2025   BMI 51.92 kg/m²     Physical Exam  Vitals reviewed.   Genitourinary:     General: Normal vulva.      Vagina: No signs of injury and foreign body. Bleeding (light flow menstrual blood noted) present. No vaginal discharge, erythema, tenderness, lesions or prolapsed vaginal walls.      Cervix: Normal.      Uterus: Not tender.       Adnexa:         Right: No tenderness.          Left: No tenderness.     Skin:     General: Skin is warm and dry.   Neurological:      Mental Status: She is alert and oriented to person, place, and time.            Assessment and Plan    Diagnoses and all orders for this visit:    1. Menorrhagia with irregular cycle (Primary)  -     CBC (No Diff)  -     TSH  -     T4, Free  -     Prolactin  -     " Follicle Stimulating Hormone  -     US Non-ob Transvaginal; Future  -     IGP,rfx Aptima HPV All Pth    Will check labs and pelvic u/s. Pap due and collected. Discussed possibility of pcos. F/u after u/s.     Counseling:  She understands the importance of having the above orders performed in a timely fashion.  She is encouraged to review her results online and/or contact or office if she has questions.     Follow Up:  Return for lab and u/s f/u.      ANTHONY Hsieh  02/17/2025

## 2025-02-24 ENCOUNTER — ANESTHESIA EVENT (OUTPATIENT)
Dept: GASTROENTEROLOGY | Facility: HOSPITAL | Age: 30
End: 2025-02-24
Payer: COMMERCIAL

## 2025-02-24 ENCOUNTER — TELEPHONE (OUTPATIENT)
Dept: GASTROENTEROLOGY | Facility: CLINIC | Age: 30
End: 2025-02-24
Payer: COMMERCIAL

## 2025-02-24 LAB
CONV .: NORMAL
CYTOLOGIST CVX/VAG CYTO: NORMAL
CYTOLOGY CVX/VAG DOC CYTO: NORMAL
CYTOLOGY CVX/VAG DOC THIN PREP: NORMAL
DX ICD CODE: NORMAL
Lab: NORMAL
OTHER STN SPEC: NORMAL
SERVICE CMNT-IMP: NORMAL
STAT OF ADQ CVX/VAG CYTO-IMP: NORMAL

## 2025-02-24 NOTE — ANESTHESIA PREPROCEDURE EVALUATION
Anesthesia Evaluation     Patient summary reviewed, Nursing notes reviewed and pregnancy test completed   NPO Solid Status: > 8 hours  NPO Liquid Status: > 8 hours           Airway   Mallampati: II  TM distance: >3 FB  Neck ROM: full  Large neck circumference and Possible difficult intubation  Comment: Possible deviated septum - avoid nasal airway if possible  Dental    (+) poor dentition    Pulmonary     breath sounds clear to auscultation  (+) a smoker (current) Former,  Cardiovascular - normal exam  Exercise tolerance: good (4-7 METS)    Rhythm: regular  Rate: normal        Neuro/Psych  (+) seizures (last seizure 1 yr ago - took meds on 03/16) well controlled, headaches (Migraine), numbness, psychiatric history Anxiety, Depression and Bipolar  GI/Hepatic/Renal/Endo    (+) morbid obesity    Musculoskeletal     Abdominal   (+) obese    Abdomen: soft.   Substance History   (+) drug use (THC- regular use)     OB/GYN          Other   arthritis,     ROS/Med Hx Other: + cologuard     No EKG on file     HCG negative                Anesthesia Plan    ASA 4     general   total IV anesthesia  (Total IV Anesthesia    Patient understands anesthesia not responsible for dental damage.    Discussed risks with pt including aspiration, allergic reactions, apnea, advanced airway placement. Pt verbalized understanding. All questions answered.  )  intravenous induction     Anesthetic plan, risks, benefits, and alternatives have been provided, discussed and informed consent has been obtained with: patient.  Pre-procedure education provided  Plan discussed with CRNA.      CODE STATUS:

## 2025-02-24 NOTE — TELEPHONE ENCOUNTER
Zahida Meng  1995     Patient requested to reschedule their Colonoscopy. I have offered to reschedule this patient and patient has agreed. Patient has been rescheduled to 3.17.25    Reason for rescheduling: transportation    Original date of case request: 1.21.25    This procedure was ordered by Joe Beasley MD for an important reason. I have thoroughly discussed with the patient that there are risks associated with not proceeding with the procedure at this time such as a delay in diagnosis, risk of incurable disease, or cancer. Patient verbalized understanding the risks discussed.    Patient plans to call us back to reschedule: N/A    Updated clearance needed?: No    Is there a follow up appointment that needs to be rescheduled after the procedure date? No    Is the patient currently on any injectable medications for weight loss or diabetes? No      Has endo scheduling been notified? Yes    If yes, who did you speak to? Geraldine    Has the case request been updated? Yes

## 2025-02-25 ENCOUNTER — ANESTHESIA (OUTPATIENT)
Dept: GASTROENTEROLOGY | Facility: HOSPITAL | Age: 30
End: 2025-02-25
Payer: COMMERCIAL

## 2025-03-05 ENCOUNTER — HOSPITAL ENCOUNTER (OUTPATIENT)
Dept: ULTRASOUND IMAGING | Facility: HOSPITAL | Age: 30
Discharge: HOME | End: 2025-03-05
Admitting: OBSTETRICS & GYNECOLOGY
Payer: COMMERCIAL

## 2025-03-05 DIAGNOSIS — N92.1 MENORRHAGIA WITH IRREGULAR CYCLE: ICD-10-CM

## 2025-03-05 PROCEDURE — 76830 TRANSVAGINAL US NON-OB: CPT

## 2025-03-11 ENCOUNTER — RESULTS FOLLOW-UP (OUTPATIENT)
Dept: ULTRASOUND IMAGING | Facility: HOSPITAL | Age: 30
End: 2025-03-11
Payer: COMMERCIAL

## 2025-03-12 ENCOUNTER — OFFICE VISIT (OUTPATIENT)
Dept: OBSTETRICS AND GYNECOLOGY | Facility: CLINIC | Age: 30
End: 2025-03-12
Payer: COMMERCIAL

## 2025-03-12 VITALS
WEIGHT: 293 LBS | HEIGHT: 65 IN | HEART RATE: 84 BPM | BODY MASS INDEX: 48.82 KG/M2 | DIASTOLIC BLOOD PRESSURE: 71 MMHG | SYSTOLIC BLOOD PRESSURE: 122 MMHG

## 2025-03-12 DIAGNOSIS — N92.1 MENORRHAGIA WITH IRREGULAR CYCLE: Primary | ICD-10-CM

## 2025-03-12 RX ORDER — MEDROXYPROGESTERONE ACETATE 10 MG
10 TABLET ORAL DAILY
Qty: 10 TABLET | Refills: 5 | Status: SHIPPED | OUTPATIENT
Start: 2025-03-12 | End: 2025-03-22

## 2025-03-12 NOTE — PRE-PROCEDURE INSTRUCTIONS
Reminded of arrival time at  1100   , Entrance C of the [facility]. Instructed to bring or have a  over the age of 18 set up to drive you home the day of procedure.    Instructed on clear liquid diet the day before, nothing red or purple. Call with any questions about the prep or if in need of the prep.  Reminded them not to eat or drink anything am of procedure unless its a sip of water with medications. Hold ibuprofen am of procedure. Patient verbalized understanding.

## 2025-03-12 NOTE — PROGRESS NOTES
"GYN Problem/Follow Up Visit    Chief Complaint   Patient presents with    follow up ultrasound           HPI  Zahida Meng is a 29 y.o. female, , who presents for lab and u/s f/u. Recently seen for heavy irreg menses. See previous note. No new concerns. Significant other at bedside per pt request.        Additional OB/GYN History   Patient's last menstrual period was 2025.  Current contraception: contraceptive methods: None  Desires to: do not start contraception  Allergies : Amoxicillin, Augmentin [amoxicillin-pot clavulanate], Doxycycline, Keflex [cephalexin], and Penicillins     The additional following portions of the patient's history were reviewed and updated as appropriate: allergies, current medications, past family history, past medical history, past social history, past surgical history, and problem list.    Review of Systems    I have reviewed and agree with the HPI, ROS, and historical information as entered above. Linda Ramirez, APRN    Objective   /71   Pulse 84   Ht 165.1 cm (65\")   Wt (!) 142 kg (312 lb)   LMP 2025   BMI 51.92 kg/m²     Physical Exam  Vitals reviewed.   Neurological:      Mental Status: She is alert and oriented to person, place, and time.            Assessment and Plan    Diagnoses and all orders for this visit:    1. Menorrhagia with irregular cycle (Primary)  -     medroxyPROGESTERone (Provera) 10 MG tablet; Take 1 tablet by mouth Daily for 10 days.  Dispense: 10 tablet; Refill: 5    Reviewed labs from 25: normal. Reviewed pelvic u/s from 3/5/25: normal. Discussed tx options to help regulate cycles. Pt declines bc. She desires taking provera as needed for when her menses starts to space out. She will f/u for any concerns.     Counseling:  She understands the importance of having the above orders performed in a timely fashion.  She is encouraged to review her results online and/or contact or office if she has questions.     Follow Up:  Return " in about 1 year (around 3/12/2026) for Annual physical.      Linda Ramirez, ANTHONY  03/12/2025

## 2025-03-17 ENCOUNTER — HOSPITAL ENCOUNTER (OUTPATIENT)
Facility: HOSPITAL | Age: 30
Setting detail: HOSPITAL OUTPATIENT SURGERY
Discharge: HOME | End: 2025-03-17
Attending: INTERNAL MEDICINE | Admitting: INTERNAL MEDICINE
Payer: COMMERCIAL

## 2025-03-17 VITALS
SYSTOLIC BLOOD PRESSURE: 119 MMHG | RESPIRATION RATE: 18 BRPM | OXYGEN SATURATION: 100 % | WEIGHT: 293 LBS | BODY MASS INDEX: 52.06 KG/M2 | TEMPERATURE: 98.1 F | HEART RATE: 78 BPM | DIASTOLIC BLOOD PRESSURE: 72 MMHG

## 2025-03-17 DIAGNOSIS — R19.5 POSITIVE COLORECTAL CANCER SCREENING USING COLOGUARD TEST: ICD-10-CM

## 2025-03-17 LAB — B-HCG UR QL: NEGATIVE

## 2025-03-17 PROCEDURE — 81025 URINE PREGNANCY TEST: CPT | Performed by: INTERNAL MEDICINE

## 2025-03-17 PROCEDURE — 88305 TISSUE EXAM BY PATHOLOGIST: CPT | Performed by: INTERNAL MEDICINE

## 2025-03-17 PROCEDURE — 25010000002 PROPOFOL 10 MG/ML EMULSION: Performed by: NURSE ANESTHETIST, CERTIFIED REGISTERED

## 2025-03-17 PROCEDURE — 25010000002 LIDOCAINE PF 2% 2 % SOLUTION: Performed by: NURSE ANESTHETIST, CERTIFIED REGISTERED

## 2025-03-17 PROCEDURE — 25810000003 LACTATED RINGERS PER 1000 ML

## 2025-03-17 RX ORDER — PROPOFOL 10 MG/ML
VIAL (ML) INTRAVENOUS AS NEEDED
Status: DISCONTINUED | OUTPATIENT
Start: 2025-03-17 | End: 2025-03-17 | Stop reason: SURG

## 2025-03-17 RX ORDER — SODIUM CHLORIDE, SODIUM LACTATE, POTASSIUM CHLORIDE, CALCIUM CHLORIDE 600; 310; 30; 20 MG/100ML; MG/100ML; MG/100ML; MG/100ML
30 INJECTION, SOLUTION INTRAVENOUS CONTINUOUS
Status: DISCONTINUED | OUTPATIENT
Start: 2025-03-17 | End: 2025-03-17 | Stop reason: HOSPADM

## 2025-03-17 RX ORDER — LIDOCAINE HYDROCHLORIDE 20 MG/ML
INJECTION, SOLUTION EPIDURAL; INFILTRATION; INTRACAUDAL; PERINEURAL AS NEEDED
Status: DISCONTINUED | OUTPATIENT
Start: 2025-03-17 | End: 2025-03-17 | Stop reason: SURG

## 2025-03-17 RX ADMIN — PROPOFOL 200 MCG/KG/MIN: 10 INJECTION, EMULSION INTRAVENOUS at 13:53

## 2025-03-17 RX ADMIN — SODIUM CHLORIDE, SODIUM LACTATE, POTASSIUM CHLORIDE, CALCIUM CHLORIDE 30 ML/HR: 20; 30; 600; 310 INJECTION, SOLUTION INTRAVENOUS at 12:28

## 2025-03-17 RX ADMIN — PROPOFOL 100 MG: 10 INJECTION, EMULSION INTRAVENOUS at 13:53

## 2025-03-17 RX ADMIN — LIDOCAINE HYDROCHLORIDE 100 MG: 20 INJECTION, SOLUTION EPIDURAL; INFILTRATION; INTRACAUDAL; PERINEURAL at 13:53
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
## 2025-03-17 NOTE — ANESTHESIA POSTPROCEDURE EVALUATION
Patient: Zahida Meng    Procedure Summary       Date: 03/17/25 Room / Location: [facility] ENDOSCOPY 4 / [facility]    Anesthesia Start: 1351 Anesthesia Stop: 1421    Procedure: COLONOSCOPY WITH COLD SNARE POLYPECTOMY, BIOSPSIES Diagnosis:       Positive colorectal cancer screening using Cologuard test      (Positive colorectal cancer screening using Cologuard test [R19.5])    Surgeons: Jose Roberto Beasley MD Provider: Lamin Giang CRNA    Anesthesia Type: general ASA Status: 4            Anesthesia Type: general    Vitals  Vitals Value Taken Time   /72 03/17/25 14:35   Temp 36.7 °C (98.1 °F) 03/17/25 14:20   Pulse 72 03/17/25 14:36   Resp 18 03/17/25 14:35   SpO2 98 % 03/17/25 14:36   Vitals shown include unfiled device data.        Post Anesthesia Care and Evaluation    Post-procedure mental status: acceptable.  Pain management: satisfactory to patient    Airway patency: patent  Anesthetic complications: No anesthetic complications    Cardiovascular status: acceptable  Respiratory status: acceptable    Comments: Per chart review

## 2025-03-19 LAB
CYTO UR: NORMAL
LAB AP CASE REPORT: NORMAL
LAB AP CLINICAL INFORMATION: NORMAL
PATH REPORT.FINAL DX SPEC: NORMAL
PATH REPORT.GROSS SPEC: NORMAL

## 2025-03-25 ENCOUNTER — HOSPITAL ENCOUNTER (EMERGENCY)
Facility: HOSPITAL | Age: 30
Discharge: LEFT BEFORE DISCHARGE | End: 2025-03-26
Attending: EMERGENCY MEDICINE
Payer: COMMERCIAL

## 2025-03-25 ENCOUNTER — APPOINTMENT (OUTPATIENT)
Dept: GENERAL RADIOLOGY | Facility: HOSPITAL | Age: 30
End: 2025-03-25
Payer: COMMERCIAL

## 2025-03-25 VITALS
BODY MASS INDEX: 48.82 KG/M2 | RESPIRATION RATE: 18 BRPM | DIASTOLIC BLOOD PRESSURE: 94 MMHG | TEMPERATURE: 98.6 F | WEIGHT: 293 LBS | SYSTOLIC BLOOD PRESSURE: 140 MMHG | HEIGHT: 65 IN | HEART RATE: 93 BPM | OXYGEN SATURATION: 100 %

## 2025-03-25 LAB
ALBUMIN SERPL-MCNC: 4.6 G/DL (ref 3.5–5.2)
ALBUMIN/GLOB SERPL: 1.2 G/DL
ALP SERPL-CCNC: 67 U/L (ref 39–117)
ALT SERPL W P-5'-P-CCNC: 34 U/L (ref 1–33)
ANION GAP SERPL CALCULATED.3IONS-SCNC: 14.7 MMOL/L (ref 5–15)
AST SERPL-CCNC: 26 U/L (ref 1–32)
BASOPHILS # BLD AUTO: 0.07 10*3/MM3 (ref 0–0.2)
BASOPHILS NFR BLD AUTO: 0.6 % (ref 0–1.5)
BILIRUB SERPL-MCNC: 0.3 MG/DL (ref 0–1.2)
BUN SERPL-MCNC: 6 MG/DL (ref 6–20)
BUN/CREAT SERPL: 9.7 (ref 7–25)
CALCIUM SPEC-SCNC: 9.7 MG/DL (ref 8.6–10.5)
CHLORIDE SERPL-SCNC: 98 MMOL/L (ref 98–107)
CO2 SERPL-SCNC: 24.3 MMOL/L (ref 22–29)
CREAT SERPL-MCNC: 0.62 MG/DL (ref 0.57–1)
DEPRECATED RDW RBC AUTO: 39.4 FL (ref 37–54)
EGFRCR SERPLBLD CKD-EPI 2021: 124.6 ML/MIN/1.73
EOSINOPHIL # BLD AUTO: 0.06 10*3/MM3 (ref 0–0.4)
EOSINOPHIL NFR BLD AUTO: 0.5 % (ref 0.3–6.2)
ERYTHROCYTE [DISTWIDTH] IN BLOOD BY AUTOMATED COUNT: 12.6 % (ref 12.3–15.4)
GLOBULIN UR ELPH-MCNC: 3.8 GM/DL
GLUCOSE SERPL-MCNC: 101 MG/DL (ref 65–99)
HCT VFR BLD AUTO: 43.4 % (ref 34–46.6)
HGB BLD-MCNC: 14.6 G/DL (ref 12–15.9)
HOLD SPECIMEN: NORMAL
HOLD SPECIMEN: NORMAL
IMM GRANULOCYTES # BLD AUTO: 0.02 10*3/MM3 (ref 0–0.05)
IMM GRANULOCYTES NFR BLD AUTO: 0.2 % (ref 0–0.5)
LYMPHOCYTES # BLD AUTO: 1.99 10*3/MM3 (ref 0.7–3.1)
LYMPHOCYTES NFR BLD AUTO: 18.2 % (ref 19.6–45.3)
MAGNESIUM SERPL-MCNC: 1.9 MG/DL (ref 1.6–2.6)
MCH RBC QN AUTO: 29 PG (ref 26.6–33)
MCHC RBC AUTO-ENTMCNC: 33.6 G/DL (ref 31.5–35.7)
MCV RBC AUTO: 86.1 FL (ref 79–97)
MONOCYTES # BLD AUTO: 0.64 10*3/MM3 (ref 0.1–0.9)
MONOCYTES NFR BLD AUTO: 5.8 % (ref 5–12)
NEUTROPHILS NFR BLD AUTO: 74.7 % (ref 42.7–76)
NEUTROPHILS NFR BLD AUTO: 8.18 10*3/MM3 (ref 1.7–7)
NRBC BLD AUTO-RTO: 0 /100 WBC (ref 0–0.2)
NT-PROBNP SERPL-MCNC: 56.5 PG/ML (ref 0–450)
PLATELET # BLD AUTO: 396 10*3/MM3 (ref 140–450)
PMV BLD AUTO: 9.8 FL (ref 6–12)
POTASSIUM SERPL-SCNC: 3.6 MMOL/L (ref 3.5–5.2)
PROT SERPL-MCNC: 8.4 G/DL (ref 6–8.5)
QT INTERVAL: 347 MS
QTC INTERVAL: 467 MS
RBC # BLD AUTO: 5.04 10*6/MM3 (ref 3.77–5.28)
SODIUM SERPL-SCNC: 137 MMOL/L (ref 136–145)
TROPONIN T SERPL HS-MCNC: <6 NG/L
TSH SERPL DL<=0.05 MIU/L-ACNC: 1.14 UIU/ML (ref 0.27–4.2)
WBC NRBC COR # BLD AUTO: 10.96 10*3/MM3 (ref 3.4–10.8)
WHOLE BLOOD HOLD COAG: NORMAL
WHOLE BLOOD HOLD SPECIMEN: NORMAL

## 2025-03-25 PROCEDURE — 83735 ASSAY OF MAGNESIUM: CPT

## 2025-03-25 PROCEDURE — 85025 COMPLETE CBC W/AUTO DIFF WBC: CPT

## 2025-03-25 PROCEDURE — 93005 ELECTROCARDIOGRAM TRACING: CPT | Performed by: EMERGENCY MEDICINE

## 2025-03-25 PROCEDURE — 84443 ASSAY THYROID STIM HORMONE: CPT

## 2025-03-25 PROCEDURE — 99211 OFF/OP EST MAY X REQ PHY/QHP: CPT | Performed by: EMERGENCY MEDICINE

## 2025-03-25 PROCEDURE — 84484 ASSAY OF TROPONIN QUANT: CPT

## 2025-03-25 PROCEDURE — 83880 ASSAY OF NATRIURETIC PEPTIDE: CPT

## 2025-03-25 PROCEDURE — 99211 OFF/OP EST MAY X REQ PHY/QHP: CPT

## 2025-03-25 PROCEDURE — 71045 X-RAY EXAM CHEST 1 VIEW: CPT

## 2025-03-25 PROCEDURE — 93005 ELECTROCARDIOGRAM TRACING: CPT

## 2025-03-25 PROCEDURE — 80053 COMPREHEN METABOLIC PANEL: CPT

## 2025-03-25 PROCEDURE — 99284 EMERGENCY DEPT VISIT MOD MDM: CPT

## 2025-03-25 PROCEDURE — 36415 COLL VENOUS BLD VENIPUNCTURE: CPT

## 2025-03-25 RX ORDER — SODIUM CHLORIDE 0.9 % (FLUSH) 0.9 %
10 SYRINGE (ML) INJECTION AS NEEDED
Status: DISCONTINUED | OUTPATIENT
Start: 2025-03-25 | End: 2025-03-26 | Stop reason: HOSPADM
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
## 2025-04-13 LAB
QT INTERVAL: 347 MS
QTC INTERVAL: 467 MS

## 2025-07-17 ENCOUNTER — OFFICE VISIT (OUTPATIENT)
Dept: GASTROENTEROLOGY | Facility: CLINIC | Age: 30
End: 2025-07-17
Payer: COMMERCIAL

## 2025-07-17 VITALS
WEIGHT: 293 LBS | HEART RATE: 88 BPM | HEIGHT: 65 IN | BODY MASS INDEX: 48.82 KG/M2 | DIASTOLIC BLOOD PRESSURE: 72 MMHG | SYSTOLIC BLOOD PRESSURE: 118 MMHG

## 2025-07-17 DIAGNOSIS — K52.9 COLITIS: ICD-10-CM

## 2025-07-17 DIAGNOSIS — R13.10 DYSPHAGIA, UNSPECIFIED TYPE: Primary | ICD-10-CM

## 2025-07-17 DIAGNOSIS — R11.2 NAUSEA AND VOMITING, UNSPECIFIED VOMITING TYPE: ICD-10-CM

## 2025-07-17 DIAGNOSIS — R10.10 UPPER ABDOMINAL PAIN: ICD-10-CM

## 2025-07-17 DIAGNOSIS — R19.7 DIARRHEA, UNSPECIFIED TYPE: ICD-10-CM

## 2025-07-17 DIAGNOSIS — Z79.1 NSAID LONG-TERM USE: ICD-10-CM

## 2025-07-17 RX ORDER — PROPRANOLOL HCL 20 MG
1 TABLET ORAL EVERY 12 HOURS SCHEDULED
COMMUNITY
Start: 2025-07-03

## 2025-07-17 RX ORDER — PANTOPRAZOLE SODIUM 40 MG/1
40 TABLET, DELAYED RELEASE ORAL DAILY
Qty: 30 TABLET | Refills: 5 | Status: SHIPPED | OUTPATIENT
Start: 2025-07-17

## 2025-07-17 RX ORDER — CYCLOBENZAPRINE HCL 10 MG
TABLET ORAL
COMMUNITY
Start: 2025-07-03

## 2025-07-17 RX ORDER — LIDOCAINE 50 MG/G
PATCH TOPICAL
COMMUNITY
Start: 2025-06-11

## 2025-07-17 RX ORDER — CETIRIZINE HYDROCHLORIDE 10 MG/1
1 TABLET ORAL DAILY
COMMUNITY
Start: 2025-07-03

## 2025-07-17 RX ORDER — PRAZOSIN HYDROCHLORIDE 2 MG/1
2 CAPSULE ORAL NIGHTLY
COMMUNITY
Start: 2025-07-08

## 2025-07-17 RX ORDER — MEDROXYPROGESTERONE ACETATE 10 MG
10 TABLET ORAL DAILY
COMMUNITY
Start: 2025-03-12

## 2025-07-17 RX ORDER — HYDROXYZINE HYDROCHLORIDE 25 MG/1
TABLET, FILM COATED ORAL
COMMUNITY
Start: 2025-07-03

## 2025-07-17 RX ORDER — MELOXICAM 15 MG/1
1 TABLET ORAL DAILY
COMMUNITY
Start: 2025-07-03

## 2025-07-17 RX ORDER — MESALAMINE 1.2 G/1
4800 TABLET, DELAYED RELEASE ORAL
Qty: 120 TABLET | Refills: 3 | Status: SHIPPED | OUTPATIENT
Start: 2025-07-17

## 2025-07-17 RX ORDER — ALBUTEROL SULFATE 90 UG/1
AEROSOL, METERED RESPIRATORY (INHALATION)
COMMUNITY
Start: 2025-07-03
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
## 2025-07-22 ENCOUNTER — TELEPHONE (OUTPATIENT)
Dept: OBSTETRICS AND GYNECOLOGY | Age: 30
End: 2025-07-22
Payer: COMMERCIAL

## 2025-07-22 DIAGNOSIS — O36.80X0 PREGNANCY WITH INCONCLUSIVE FETAL VIABILITY, SINGLE OR UNSPECIFIED FETUS: Primary | ICD-10-CM

## 2025-07-22 NOTE — TELEPHONE ENCOUNTER
Established patient with +HPT. LMP 6/1/25. Scheduled for New OB on 8/11/25. Further recommendations/orders?

## 2025-07-23 ENCOUNTER — LAB (OUTPATIENT)
Facility: HOSPITAL | Age: 30
End: 2025-07-23
Payer: COMMERCIAL

## 2025-07-23 DIAGNOSIS — O36.80X0 PREGNANCY WITH INCONCLUSIVE FETAL VIABILITY, SINGLE OR UNSPECIFIED FETUS: ICD-10-CM

## 2025-07-23 LAB — HCG INTACT+B SERPL-ACNC: NORMAL MIU/ML

## 2025-07-23 PROCEDURE — 84702 CHORIONIC GONADOTROPIN TEST: CPT

## 2025-07-24 ENCOUNTER — RESULTS FOLLOW-UP (OUTPATIENT)
Dept: OBSTETRICS AND GYNECOLOGY | Age: 30
End: 2025-07-24
Payer: COMMERCIAL

## 2025-08-07 ENCOUNTER — TELEPHONE (OUTPATIENT)
Age: 30
End: 2025-08-07
Payer: COMMERCIAL

## 2025-08-07 ENCOUNTER — OFFICE VISIT (OUTPATIENT)
Dept: NEUROLOGY | Facility: CLINIC | Age: 30
End: 2025-08-07
Payer: COMMERCIAL

## 2025-08-07 VITALS
HEART RATE: 91 BPM | WEIGHT: 293 LBS | DIASTOLIC BLOOD PRESSURE: 80 MMHG | SYSTOLIC BLOOD PRESSURE: 127 MMHG | BODY MASS INDEX: 48.82 KG/M2 | HEIGHT: 65 IN

## 2025-08-07 DIAGNOSIS — G40.109 PARTIAL EPILEPSY ORIGINATING IN TEMPORAL LOBE: ICD-10-CM

## 2025-08-07 DIAGNOSIS — G43.009 MIGRAINE WITHOUT AURA AND WITHOUT STATUS MIGRAINOSUS, NOT INTRACTABLE: Primary | ICD-10-CM

## 2025-08-08 RX ORDER — RIZATRIPTAN BENZOATE 10 MG/1
10 TABLET, ORALLY DISINTEGRATING ORAL ONCE AS NEEDED
Qty: 9 TABLET | Refills: 5 | Status: SHIPPED | OUTPATIENT
Start: 2025-08-08 | End: 2025-09-07

## 2025-08-08 RX ORDER — LEVETIRACETAM 1000 MG/1
1000 TABLET ORAL 2 TIMES DAILY
Qty: 60 TABLET | Refills: 5 | Status: SHIPPED | OUTPATIENT
Start: 2025-08-08

## 2025-08-11 ENCOUNTER — INITIAL PRENATAL (OUTPATIENT)
Dept: OBSTETRICS AND GYNECOLOGY | Age: 30
End: 2025-08-11
Payer: COMMERCIAL

## 2025-08-11 VITALS — SYSTOLIC BLOOD PRESSURE: 125 MMHG | DIASTOLIC BLOOD PRESSURE: 85 MMHG | BODY MASS INDEX: 50.92 KG/M2 | WEIGHT: 293 LBS

## 2025-08-11 DIAGNOSIS — Z34.80 SUPERVISION OF OTHER NORMAL PREGNANCY, ANTEPARTUM: Primary | ICD-10-CM

## 2025-08-11 DIAGNOSIS — O99.519 ASTHMA AFFECTING PREGNANCY, ANTEPARTUM: ICD-10-CM

## 2025-08-11 DIAGNOSIS — O99.210 OBESITY AFFECTING PREGNANCY, ANTEPARTUM, UNSPECIFIED OBESITY TYPE: ICD-10-CM

## 2025-08-11 DIAGNOSIS — Z98.891 PREVIOUS CESAREAN SECTION: ICD-10-CM

## 2025-08-11 DIAGNOSIS — O21.9 NAUSEA AND VOMITING DURING PREGNANCY: ICD-10-CM

## 2025-08-11 DIAGNOSIS — O99.350 SEIZURE DISORDER DURING PREGNANCY, ANTEPARTUM: ICD-10-CM

## 2025-08-11 DIAGNOSIS — F32.A ANXIETY AND DEPRESSION: ICD-10-CM

## 2025-08-11 DIAGNOSIS — Z3A.10 10 WEEKS GESTATION OF PREGNANCY: ICD-10-CM

## 2025-08-11 DIAGNOSIS — G40.909 SEIZURE DISORDER DURING PREGNANCY, ANTEPARTUM: ICD-10-CM

## 2025-08-11 DIAGNOSIS — Z87.59 HISTORY OF IUFD: ICD-10-CM

## 2025-08-11 DIAGNOSIS — J45.909 ASTHMA AFFECTING PREGNANCY, ANTEPARTUM: ICD-10-CM

## 2025-08-11 DIAGNOSIS — F41.9 ANXIETY AND DEPRESSION: ICD-10-CM

## 2025-08-11 LAB
ABO GROUP BLD: NORMAL
ALBUMIN SERPL-MCNC: 4.5 G/DL (ref 3.5–5.2)
ALBUMIN/GLOB SERPL: 1.3 G/DL
ALP SERPL-CCNC: 67 U/L (ref 39–117)
ALT SERPL W P-5'-P-CCNC: 23 U/L (ref 1–33)
AMPHET+METHAMPHET UR QL: NEGATIVE
AMPHETAMINES UR QL: NEGATIVE
ANION GAP SERPL CALCULATED.3IONS-SCNC: 13.5 MMOL/L (ref 5–15)
AST SERPL-CCNC: 21 U/L (ref 1–32)
BARBITURATES UR QL SCN: NEGATIVE
BENZODIAZ UR QL SCN: NEGATIVE
BILIRUB SERPL-MCNC: 0.3 MG/DL (ref 0–1.2)
BLD GP AB SCN SERPL QL: NEGATIVE
BUN SERPL-MCNC: 8 MG/DL (ref 6–20)
BUN/CREAT SERPL: 16.7 (ref 7–25)
BUPRENORPHINE SERPL-MCNC: NEGATIVE NG/ML
CALCIUM SPEC-SCNC: 9.9 MG/DL (ref 8.6–10.5)
CANNABINOIDS SERPL QL: POSITIVE
CHLORIDE SERPL-SCNC: 100 MMOL/L (ref 98–107)
CO2 SERPL-SCNC: 22.5 MMOL/L (ref 22–29)
COCAINE UR QL: NEGATIVE
CREAT SERPL-MCNC: 0.48 MG/DL (ref 0.57–1)
CREAT UR-MCNC: 293.6 MG/DL
DEPRECATED RDW RBC AUTO: 41.1 FL (ref 37–54)
EGFRCR SERPLBLD CKD-EPI 2021: 131.7 ML/MIN/1.73
ERYTHROCYTE [DISTWIDTH] IN BLOOD BY AUTOMATED COUNT: 12.6 % (ref 12.3–15.4)
FENTANYL UR-MCNC: NEGATIVE NG/ML
GLOBULIN UR ELPH-MCNC: 3.6 GM/DL
GLUCOSE SERPL-MCNC: 77 MG/DL (ref 65–99)
GLUCOSE UR STRIP-MCNC: NEGATIVE MG/DL
HBA1C MFR BLD: 5 % (ref 4.8–5.6)
HBV SURFACE AG SERPL QL IA: NORMAL
HCT VFR BLD AUTO: 41.6 % (ref 34–46.6)
HCV AB SER QL: NORMAL
HGB BLD-MCNC: 14.3 G/DL (ref 12–15.9)
HIV 1+2 AB+HIV1 P24 AG SERPL QL IA: NORMAL
MCH RBC QN AUTO: 30.8 PG (ref 26.6–33)
MCHC RBC AUTO-ENTMCNC: 34.4 G/DL (ref 31.5–35.7)
MCV RBC AUTO: 89.7 FL (ref 79–97)
METHADONE UR QL SCN: NEGATIVE
OPIATES UR QL: NEGATIVE
OXYCODONE UR QL SCN: NEGATIVE
PCP UR QL SCN: NEGATIVE
PLATELET # BLD AUTO: 341 10*3/MM3 (ref 140–450)
PMV BLD AUTO: 10.1 FL (ref 6–12)
POTASSIUM SERPL-SCNC: 3.6 MMOL/L (ref 3.5–5.2)
PROT ?TM UR-MCNC: 28.6 MG/DL
PROT SERPL-MCNC: 8.1 G/DL (ref 6–8.5)
PROT UR STRIP-MCNC: NEGATIVE MG/DL
PROT/CREAT UR: 0.1 MG/G{CREAT}
RBC # BLD AUTO: 4.64 10*6/MM3 (ref 3.77–5.28)
RH BLD: POSITIVE
SODIUM SERPL-SCNC: 136 MMOL/L (ref 136–145)
TRICYCLICS UR QL SCN: NEGATIVE
WBC NRBC COR # BLD AUTO: 10.15 10*3/MM3 (ref 3.4–10.8)

## 2025-08-11 PROCEDURE — 87591 N.GONORRHOEAE DNA AMP PROB: CPT | Performed by: NURSE PRACTITIONER

## 2025-08-11 PROCEDURE — 87661 TRICHOMONAS VAGINALIS AMPLIF: CPT | Performed by: NURSE PRACTITIONER

## 2025-08-11 PROCEDURE — 86803 HEPATITIS C AB TEST: CPT | Performed by: NURSE PRACTITIONER

## 2025-08-11 PROCEDURE — 82570 ASSAY OF URINE CREATININE: CPT | Performed by: NURSE PRACTITIONER

## 2025-08-11 PROCEDURE — 80053 COMPREHEN METABOLIC PANEL: CPT | Performed by: NURSE PRACTITIONER

## 2025-08-11 PROCEDURE — 84156 ASSAY OF PROTEIN URINE: CPT | Performed by: NURSE PRACTITIONER

## 2025-08-11 PROCEDURE — 83036 HEMOGLOBIN GLYCOSYLATED A1C: CPT | Performed by: NURSE PRACTITIONER

## 2025-08-11 PROCEDURE — 87086 URINE CULTURE/COLONY COUNT: CPT | Performed by: NURSE PRACTITIONER

## 2025-08-11 PROCEDURE — 99214 OFFICE O/P EST MOD 30 MIN: CPT | Performed by: NURSE PRACTITIONER

## 2025-08-11 PROCEDURE — 83020 HEMOGLOBIN ELECTROPHORESIS: CPT | Performed by: NURSE PRACTITIONER

## 2025-08-11 PROCEDURE — 80307 DRUG TEST PRSMV CHEM ANLYZR: CPT | Performed by: NURSE PRACTITIONER

## 2025-08-11 PROCEDURE — 87491 CHLMYD TRACH DNA AMP PROBE: CPT | Performed by: NURSE PRACTITIONER

## 2025-08-11 PROCEDURE — 80081 OBSTETRIC PANEL INC HIV TSTG: CPT | Performed by: NURSE PRACTITIONER

## 2025-08-11 RX ORDER — PYRIDOXINE HCL (VITAMIN B6) 25 MG
25 TABLET ORAL EVERY 8 HOURS
Qty: 90 TABLET | Refills: 2 | Status: SHIPPED | OUTPATIENT
Start: 2025-08-11

## 2025-08-11 RX ORDER — ASPIRIN 81 MG/1
81 TABLET ORAL DAILY
Qty: 90 TABLET | Refills: 7 | Status: SHIPPED | OUTPATIENT
Start: 2025-08-11

## 2025-08-12 PROBLEM — R82.5 POSITIVE URINE DRUG SCREEN: Status: ACTIVE | Noted: 2025-08-12

## 2025-08-12 LAB
BACTERIA SPEC AEROBE CULT: NORMAL
RPR SER QL: NORMAL

## 2025-08-13 LAB
C TRACH RRNA SPEC QL NAA+PROBE: NEGATIVE
HGB A MFR BLD ELPH: 97.7 % (ref 96.4–98.8)
HGB A2 MFR BLD ELPH: 2.3 % (ref 1.8–3.2)
HGB F MFR BLD ELPH: 0 % (ref 0–2)
HGB FRACT BLD-IMP: NORMAL
HGB S MFR BLD ELPH: 0 %
N GONORRHOEA RRNA SPEC QL NAA+PROBE: NEGATIVE
RUBV IGG SERPL IA-ACNC: 1.18 INDEX
T VAGINALIS RRNA SPEC QL NAA+PROBE: NEGATIVE

## 2025-08-18 ENCOUNTER — RESULTS FOLLOW-UP (OUTPATIENT)
Dept: OBSTETRICS AND GYNECOLOGY | Age: 30
End: 2025-08-18
Payer: COMMERCIAL

## 2025-08-21 ENCOUNTER — HOSPITAL ENCOUNTER (EMERGENCY)
Facility: HOSPITAL | Age: 30
Discharge: HOME | End: 2025-08-21
Attending: EMERGENCY MEDICINE
Payer: COMMERCIAL

## 2025-08-21 ENCOUNTER — TELEPHONE (OUTPATIENT)
Dept: OBSTETRICS AND GYNECOLOGY | Age: 30
End: 2025-08-21
Payer: COMMERCIAL

## 2025-08-21 ENCOUNTER — APPOINTMENT (OUTPATIENT)
Dept: ULTRASOUND IMAGING | Facility: HOSPITAL | Age: 30
End: 2025-08-21
Payer: COMMERCIAL

## 2025-08-27 ENCOUNTER — ROUTINE PRENATAL (OUTPATIENT)
Dept: OBSTETRICS AND GYNECOLOGY | Age: 30
End: 2025-08-27
Payer: COMMERCIAL

## 2025-08-27 VITALS — SYSTOLIC BLOOD PRESSURE: 116 MMHG | WEIGHT: 293 LBS | BODY MASS INDEX: 51.09 KG/M2 | DIASTOLIC BLOOD PRESSURE: 72 MMHG

## 2025-08-27 DIAGNOSIS — Z3A.12 12 WEEKS GESTATION OF PREGNANCY: Primary | ICD-10-CM

## 2025-08-27 DIAGNOSIS — Z34.80 SUPERVISION OF OTHER NORMAL PREGNANCY, ANTEPARTUM: ICD-10-CM

## 2025-08-27 DIAGNOSIS — O99.210 OBESITY AFFECTING PREGNANCY, ANTEPARTUM, UNSPECIFIED OBESITY TYPE: ICD-10-CM

## 2025-08-27 DIAGNOSIS — Z87.59 HISTORY OF IUFD: ICD-10-CM

## 2025-08-27 LAB
GLUCOSE UR STRIP-MCNC: NEGATIVE MG/DL
PROT UR STRIP-MCNC: NEGATIVE MG/DL

## (undated) DEVICE — SINGLE-USE BIOPSY FORCEPS: Brand: RADIAL JAW 4

## (undated) DEVICE — THE SINGLE USE ETRAP – POLYP TRAP IS USED FOR SUCTION RETRIEVAL OF ENDOSCOPICALLY REMOVED POLYPS.: Brand: ETRAP

## (undated) DEVICE — SNAR E/S POLYP SNAREMASTER OVL/10MM 2.8X2300MM YEL

## (undated) DEVICE — DEFENDO AIR WATER SUCTION AND BIOPSY VALVE KIT FOR  OLYMPUS: Brand: DEFENDO AIR/WATER/SUCTION AND BIOPSY VALVE

## (undated) DEVICE — SOL IRRG H2O PL/BG 1000ML STRL

## (undated) DEVICE — Device

## (undated) DEVICE — SOLIDIFIER LIQLOC PLS 1500CC BT

## (undated) DEVICE — THE STERILE LIGHT HANDLE COVER IS USED WITH STERIS SURGICAL LIGHTING AND VISUALIZATION SYSTEMS.